# Patient Record
Sex: FEMALE | Race: WHITE | NOT HISPANIC OR LATINO | URBAN - METROPOLITAN AREA
[De-identification: names, ages, dates, MRNs, and addresses within clinical notes are randomized per-mention and may not be internally consistent; named-entity substitution may affect disease eponyms.]

---

## 2021-11-24 LAB
EXTERNAL HIV CONFIRMATION: NORMAL
EXTERNAL HIV SCREEN: NORMAL
HCV AB SER-ACNC: NEGATIVE

## 2024-01-25 ENCOUNTER — OFFICE VISIT (OUTPATIENT)
Dept: URGENT CARE | Facility: CLINIC | Age: 54
End: 2024-01-25
Payer: COMMERCIAL

## 2024-01-25 VITALS
DIASTOLIC BLOOD PRESSURE: 84 MMHG | WEIGHT: 180 LBS | OXYGEN SATURATION: 96 % | BODY MASS INDEX: 35.34 KG/M2 | SYSTOLIC BLOOD PRESSURE: 114 MMHG | RESPIRATION RATE: 18 BRPM | HEIGHT: 60 IN | TEMPERATURE: 98.3 F | HEART RATE: 83 BPM

## 2024-01-25 DIAGNOSIS — J40 SINOBRONCHITIS: Primary | ICD-10-CM

## 2024-01-25 DIAGNOSIS — J32.9 SINOBRONCHITIS: Primary | ICD-10-CM

## 2024-01-25 PROCEDURE — G0382 LEV 3 HOSP TYPE B ED VISIT: HCPCS | Performed by: PHYSICIAN ASSISTANT

## 2024-01-25 RX ORDER — ESCITALOPRAM OXALATE 10 MG/1
10 TABLET ORAL DAILY
COMMUNITY

## 2024-01-25 RX ORDER — DOXYCYCLINE 100 MG/1
100 TABLET ORAL 2 TIMES DAILY
Qty: 14 TABLET | Refills: 0 | Status: SHIPPED | OUTPATIENT
Start: 2024-01-25 | End: 2024-02-01

## 2024-01-25 RX ORDER — PREDNISONE 10 MG/1
10 TABLET ORAL DAILY
Qty: 21 TABLET | Refills: 0 | Status: SHIPPED | OUTPATIENT
Start: 2024-01-25

## 2024-01-25 RX ORDER — ALBUTEROL SULFATE 90 UG/1
AEROSOL, METERED RESPIRATORY (INHALATION)
COMMUNITY
Start: 2024-01-19

## 2024-01-25 RX ORDER — BENZONATATE 200 MG/1
200 CAPSULE ORAL 3 TIMES DAILY PRN
Qty: 20 CAPSULE | Refills: 0 | Status: SHIPPED | OUTPATIENT
Start: 2024-01-25 | End: 2024-02-08

## 2024-01-25 RX ORDER — AMOXICILLIN AND CLAVULANATE POTASSIUM 875; 125 MG/1; MG/1
TABLET, FILM COATED ORAL
COMMUNITY
Start: 2024-01-19

## 2024-01-25 RX ORDER — PREDNISONE 20 MG/1
TABLET ORAL
COMMUNITY
Start: 2024-01-19 | End: 2024-01-25

## 2024-01-25 NOTE — LETTER
January 25, 2024     Patient: Sho Harrison   YOB: 1970   Date of Visit: 1/25/2024       To Whom It May Concern:    It is my medical opinion that Sho Harrison may return to work on 1/29/2024 .           Sincerely,        Lorri Galindo PA-C

## 2024-01-26 NOTE — PROGRESS NOTES
[facility]        NAME: Sho Harrison is a 53 y.o. female  : 1970    MRN: [id number]  DATE: 2024  TIME: 7:49 PM    Assessment and Plan   Sinobronchitis [J32.9, J40]  1. Sinobronchitis  doxycycline (ADOXA) 100 MG tablet    benzonatate (TESSALON) 200 MG capsule    predniSONE 10 mg tablet    Ambulatory Referral to Family Practice            Patient Instructions   Medication as prescribed.  Reestablish care with family doctor.    Take doxycycline with food.  Do not consume dairy or reflux medications 2 hours before to hours after as this inhibit the absorption of the antibiotic.  Common side effects include nausea, vomiting, diarrhea, upset stomach.  Take a probiotic to avoid this.  Avoid sun exposure due to photophobia.      Follow up with PCP in 3-5 days.  Proceed to  ER if symptoms worsen.    Chief Complaint     Chief Complaint   Patient presents with    Cold Like Symptoms     Head congestion, cough, chest tightness x 3 weeks   Has 2 more doses of abx  Steroid finished yesterday   Also prescribed inhaler          History of Present Illness       Patient is a 53-year-old female with no significant past medical history presents the office complaining of head fullness, congestion, cough, and chest tightness for 3 weeks.  Denies fevers or chills.  Reports doing a telemedicine visit was diagnosed with sinobronchitis.  She was given an inhaler, prednisone 20 mg twice a day, and Augmentin.  States has been taking this for 1 week with little to no improvement in symptoms.        Review of Systems   Review of Systems   Constitutional:  Negative for chills and fever.   HENT:  Positive for congestion, postnasal drip and rhinorrhea. Negative for sore throat.    Respiratory:  Positive for cough and chest tightness. Negative for shortness of breath and wheezing.    Cardiovascular:  Negative for chest pain and palpitations.   Gastrointestinal:  Negative for abdominal pain, diarrhea, nausea and  vomiting.   Skin:  Negative for rash.   Neurological:  Positive for headaches. Negative for dizziness and light-headedness.         Current Medications       Current Outpatient Medications:     albuterol (PROVENTIL HFA,VENTOLIN HFA) 90 mcg/act inhaler, , Disp: , Rfl:     amoxicillin-clavulanate (AUGMENTIN) 875-125 mg per tablet, , Disp: , Rfl:     benzonatate (TESSALON) 200 MG capsule, Take 1 capsule (200 mg total) by mouth 3 (three) times a day as needed for cough for up to 14 days, Disp: 20 capsule, Rfl: 0    doxycycline (ADOXA) 100 MG tablet, Take 1 tablet (100 mg total) by mouth 2 (two) times a day for 7 days, Disp: 14 tablet, Rfl: 0    escitalopram (LEXAPRO) 10 mg tablet, Take 10 mg by mouth daily, Disp: , Rfl:     predniSONE 10 mg tablet, Take 1 tablet (10 mg total) by mouth daily Take 6 on day 1, take 5 on day 2, take 4 on day 3, take 3 on day 4, take 2 on day 5, take 1 on day 6., Disp: 21 tablet, Rfl: 0    Current Allergies     Allergies as of 2024    (No Known Allergies)            The following portions of the patient's history were reviewed and updated as appropriate: allergies, current medications, past family history, past medical history, past social history, past surgical history and problem list.     History reviewed. No pertinent past medical history.    Past Surgical History:   Procedure Laterality Date     SECTION         History reviewed. No pertinent family history.      Medications have been verified.        Objective   /84   Pulse 83   Temp 98.3 °F (36.8 °C) (Tympanic)   Resp 18   Ht 5' (1.524 m)   Wt 81.6 kg (180 lb)   SpO2 96%   BMI 35.15 kg/m²   No LMP recorded.       Physical Exam     Physical Exam  Vitals and nursing note reviewed.   Constitutional:       Appearance: Normal appearance. She is well-developed.   HENT:      Head: Normocephalic and atraumatic.      Right Ear: Tympanic membrane, ear canal and external ear normal.      Left Ear: Tympanic membrane, ear  canal and external ear normal.      Nose: Congestion and rhinorrhea present.      Mouth/Throat:      Pharynx: Uvula midline.   Eyes:      General: Lids are normal.      Conjunctiva/sclera: Conjunctivae normal.      Pupils: Pupils are equal, round, and reactive to light.   Cardiovascular:      Rate and Rhythm: Normal rate and regular rhythm.      Pulses: Normal pulses.      Heart sounds: Normal heart sounds. No murmur heard.     No friction rub. No gallop.   Pulmonary:      Effort: Pulmonary effort is normal.      Breath sounds: Normal breath sounds. No wheezing, rhonchi or rales.   Musculoskeletal:         General: Normal range of motion.      Cervical back: Neck supple.   Lymphadenopathy:      Cervical: No cervical adenopathy.   Skin:     General: Skin is warm and dry.      Capillary Refill: Capillary refill takes less than 2 seconds.   Neurological:      Mental Status: She is alert.

## 2024-04-01 PROBLEM — R25.2 LEG CRAMPING: Status: ACTIVE | Noted: 2022-12-08

## 2024-04-01 PROBLEM — F41.9 ANXIETY: Status: ACTIVE | Noted: 2023-07-20

## 2024-04-01 PROBLEM — D50.9 IRON DEFICIENCY ANEMIA: Status: ACTIVE | Noted: 2022-07-11

## 2024-04-01 PROBLEM — R76.8 ELEVATED ANTINUCLEAR ANTIBODY (ANA) LEVEL: Status: ACTIVE | Noted: 2021-10-05

## 2024-04-01 PROBLEM — D18.03 HEMANGIOMA OF LIVER: Status: ACTIVE | Noted: 2021-03-05

## 2024-04-01 PROBLEM — K57.92 DIVERTICULITIS: Status: ACTIVE | Noted: 2022-07-07

## 2024-04-01 RX ORDER — VIT C/HESPERIDIN/BIOFLAVONOIDS 500-100 MG
30 TABLET ORAL
COMMUNITY
End: 2024-04-02

## 2024-04-02 ENCOUNTER — OFFICE VISIT (OUTPATIENT)
Dept: FAMILY MEDICINE CLINIC | Facility: CLINIC | Age: 54
End: 2024-04-02
Payer: COMMERCIAL

## 2024-04-02 ENCOUNTER — TELEPHONE (OUTPATIENT)
Age: 54
End: 2024-04-02

## 2024-04-02 ENCOUNTER — TELEPHONE (OUTPATIENT)
Dept: ADMINISTRATIVE | Facility: OTHER | Age: 54
End: 2024-04-02

## 2024-04-02 VITALS
HEART RATE: 84 BPM | SYSTOLIC BLOOD PRESSURE: 130 MMHG | DIASTOLIC BLOOD PRESSURE: 90 MMHG | WEIGHT: 195 LBS | TEMPERATURE: 97.3 F | HEIGHT: 60 IN | OXYGEN SATURATION: 99 % | BODY MASS INDEX: 38.28 KG/M2

## 2024-04-02 DIAGNOSIS — Z12.31 ENCOUNTER FOR SCREENING MAMMOGRAM FOR BREAST CANCER: ICD-10-CM

## 2024-04-02 DIAGNOSIS — J40 SINOBRONCHITIS: ICD-10-CM

## 2024-04-02 DIAGNOSIS — Z00.00 ANNUAL PHYSICAL EXAM: Primary | ICD-10-CM

## 2024-04-02 DIAGNOSIS — R79.89 LOW VITAMIN D LEVEL: ICD-10-CM

## 2024-04-02 DIAGNOSIS — R73.9 HYPERGLYCEMIA: ICD-10-CM

## 2024-04-02 DIAGNOSIS — F41.9 ANXIETY: ICD-10-CM

## 2024-04-02 DIAGNOSIS — N92.1 MENORRHAGIA WITH IRREGULAR CYCLE: ICD-10-CM

## 2024-04-02 DIAGNOSIS — R76.8 ELEVATED ANTINUCLEAR ANTIBODY (ANA) LEVEL: ICD-10-CM

## 2024-04-02 DIAGNOSIS — D50.0 IRON DEFICIENCY ANEMIA DUE TO CHRONIC BLOOD LOSS: ICD-10-CM

## 2024-04-02 DIAGNOSIS — E78.2 MIXED HYPERLIPIDEMIA: ICD-10-CM

## 2024-04-02 DIAGNOSIS — J32.9 SINOBRONCHITIS: ICD-10-CM

## 2024-04-02 DIAGNOSIS — K57.92 DIVERTICULITIS: ICD-10-CM

## 2024-04-02 DIAGNOSIS — N95.1 PERIMENOPAUSAL: ICD-10-CM

## 2024-04-02 PROCEDURE — 99386 PREV VISIT NEW AGE 40-64: CPT | Performed by: NURSE PRACTITIONER

## 2024-04-02 PROCEDURE — 99214 OFFICE O/P EST MOD 30 MIN: CPT | Performed by: NURSE PRACTITIONER

## 2024-04-02 RX ORDER — TRANEXAMIC ACID 650 MG/1
TABLET ORAL
Qty: 20 TABLET | Refills: 1 | Status: SHIPPED | OUTPATIENT
Start: 2024-04-02 | End: 2024-04-02 | Stop reason: SDUPTHER

## 2024-04-02 RX ORDER — TRANEXAMIC ACID 650 MG/1
650 TABLET ORAL 2 TIMES DAILY PRN
Qty: 20 TABLET | Refills: 1 | Status: SHIPPED | OUTPATIENT
Start: 2024-04-02 | End: 2024-04-09

## 2024-04-02 RX ORDER — DIPHENOXYLATE HYDROCHLORIDE AND ATROPINE SULFATE 2.5; .025 MG/1; MG/1
1 TABLET ORAL DAILY
COMMUNITY

## 2024-04-02 NOTE — TELEPHONE ENCOUNTER
[facility] pharmacist requesting clarification on Rx: Tranexamic daily instructions.      Please review and advice  Thank you

## 2024-04-02 NOTE — LETTER
Procedure Request Form: Colonoscopy      Date Requested: 24  Patient: Sho Harrison  Patient : 1970   Referring Provider: PETEY Heck        Date of Procedure ______________________________       The above patient has informed us that they have completed their   most recent Colonoscopy at your facility. Please complete   this form and attach all corresponding procedure reports/results.    Comments __________________________________________________________  ____________________________________________________________________  ____________________________________________________________________  ____________________________________________________________________    Facility Completing Procedure _________________________________________    Form Completed By (print name) _______________________________________      Signature __________________________________________________________      These reports are needed for  compliance.    Please fax this completed form and a copy of the procedure report to our office located at [address] [place] [address] as soon as possible to Fax [phone] [place]: Phone [phone]    We thank you for your assistance in treating our mutual patient.

## 2024-04-02 NOTE — TELEPHONE ENCOUNTER
----- Message from PETEY Heck sent at 4/2/2024  8:45 AM EDT -----  Regarding: HIV,  HEP C, PAP, colonoscopy, mammo  Hello, our patient attached above has had       HIV          completed/performed. Please assist in updating the patient chart by pulling the Care Everywhere (CE) document. The date of service is 11/24/2021.     Hello, our patient attached above has had      Hep C           completed/performed. Please assist in updating the patient chart by pulling the Care Everywhere (CE) document. The date of service is 11/24/2021.    Hello, our patient attached above has had     pap            completed/performed. Please assist in updating the patient chart by pulling the Care Everywhere (CE) document. The date of service is 11/24/2021.      Hello, our patient attached above has had   a colonoscopy              completed/performed. Please assist in updating the patient chart by pulling the Care Everywhere (CE) document. The date of service is 11/219/2022 - it is documented with [facility] as a scanned documented but we are unable to retrieve it - She is being seen this morning so I will find out the location.        Hello, our patient attached above has had   mammogram              completed/performed. Please assist in updating the patient chart by pulling the Care Everywhere (CE) document. The date of service is 02/12/2021.  Is overdue but can you please link this into her health maintenance anyway?  Thanks.        [facility]

## 2024-04-02 NOTE — ASSESSMENT & PLAN NOTE
She has been on Lexapro 10 mg since 06/2023. Reports improvement with the medication - feels much more in control of her anxiety.

## 2024-04-02 NOTE — PROGRESS NOTES
ADULT ANNUAL PHYSICAL  [facility] PRIMARY CARE    NAME: Sho Harrison  AGE: 54 y.o. SEX: female  : 1970     DATE: 2024     Assessment and Plan:     Problem List Items Addressed This Visit          Behavioral Health    Anxiety     She has been on Lexapro 10 mg since 2023. Reports improvement with the medication - feels much more in control of her anxiety.            Blood    Iron deficiency anemia     Will recheck iron panel and CBC as she has been with menorrhagia for the past two months.           Relevant Orders    Iron Panel (Includes Ferritin, Iron Sat%, Iron, and TIBC)       Other    Diverticulitis     No recurrence at this time - has tried to eat better to avoid another flare up.           Elevated antinuclear antibody (PEACE) level     Notes she saw rheumatology in the past, did not return to them for a follow-up.    Denies any acute pain in her joints at this time.           Hyperlipidemia    Relevant Orders    Lipid panel    Comprehensive metabolic panel    Ambulatory Referral to Nutrition Services     Other Visit Diagnoses       Annual physical exam    -  Primary    Sinobronchitis        Menorrhagia with irregular cycle        Relevant Medications    Tranexamic Acid 650 MG TABS    Other Relevant Orders    Ambulatory Referral to Obstetrics / Gynecology    Perimenopausal        Relevant Orders    Ambulatory Referral to Obstetrics / Gynecology    Ambulatory Referral to Nutrition Services    Hyperglycemia        Relevant Orders    Hemoglobin A1C    Ambulatory Referral to Nutrition Services    Low vitamin D level        Relevant Orders    Vitamin D 25 hydroxy    BMI 38.0-38.9,adult        Relevant Orders    Ambulatory Referral to Nutrition Services    Encounter for screening mammogram for breast cancer        Relevant Orders    Mammo screening bilateral w 3d & cad            Immunizations and preventive care screenings were discussed with patient  today. Appropriate education was printed on patient's after visit summary.    Counseling:  menopause      Depression Screening and Follow-up Plan: Patient was screened for depression during today's encounter. They screened negative with a PHQ-2 score of 0.    Will have her see Gyn for menorrhagia sx.  Tranexamic acid ordered to use as needed with menses.      Screening labs ordered.     Mammogram ordered.     Colonoscopy done in  - will send for report from [facility]      BMI Counseling: Body mass index is 38.08 kg/m². The BMI is above normal. Nutrition recommendations include reducing portion sizes, decreasing overall calorie intake, and 3-5 servings of fruits/vegetables daily. Exercise recommendations include moderate aerobic physical activity for 150 minutes/week. Patient referred to nutritionist due to patient being overweight.      Return in 3 months (on 2024) for Recheck.     Chief Complaint:     Chief Complaint   Patient presents with    Physical Exam      History of Present Illness:     Adult Annual Physical   Patient here for a comprehensive physical exam. The patient reports recent weight gain over the past 9 months. Notes about 30 pound increase over time.  States of disordered bleeding - has had 4 menses in about 2 months.  Notes her menses has always been heavy but more so now.  Has taken medication in the past to help with the bleeding.      Diet and Physical Activity  Diet/Nutrition: trying to eat more healthy  Exercise: no formal exercise.      Depression Screening  PHQ-2/9 Depression Screening    Little interest or pleasure in doing things: 0 - not at all  Feeling down, depressed, or hopeless: 0 - not at all  Trouble falling or staying asleep, or sleeping too much: 1 - several days  Feeling tired or having little energy: 1 - several days  Poor appetite or overeatin - several days  Feeling bad about yourself - or that you are a failure or have let yourself or your family down: 1 -  several days  Trouble concentrating on things, such as reading the newspaper or watching television: 0 - not at all  Moving or speaking so slowly that other people could have noticed. Or the opposite - being so fidgety or restless that you have been moving around a lot more than usual: 0 - not at all  Thoughts that you would be better off dead, or of hurting yourself in some way: 0 - not at all  PHQ-2 Score: 0  PHQ-2 Interpretation: Negative depression screen  PHQ-9 Score: 4  PHQ-9 Interpretation: No or Minimal depression       General Health  Sleep: sleeps well.   Hearing: normal - bilateral.  Vision: no vision problems and wears glasses.   Dental: brushes teeth twice daily.       /GYN Health  Follows with gynecology? yes   Patient is: perimenopausal  Last menstrual period: irregular see above  Contraceptive method: menopause.    Advanced Care Planning  Do you have an advanced directive? no  Do you have a durable medical power of ? no  ACP document given to the patient? no     Review of Systems:     Review of Systems   Constitutional: Negative.    HENT: Negative.     Respiratory: Negative.     Cardiovascular: Negative.    Gastrointestinal: Negative.    Genitourinary:  Positive for menstrual problem.   Neurological: Negative.    All other systems reviewed and are negative.     Past Medical History:     History reviewed. No pertinent past medical history.   Past Surgical History:     Past Surgical History:   Procedure Laterality Date     SECTION        Social History:     Social History     Socioeconomic History    Marital status:      Spouse name: None    Number of children: None    Years of education: None    Highest education level: None   Occupational History    None   Tobacco Use    Smoking status: Never     Passive exposure: Never    Smokeless tobacco: Never   Vaping Use    Vaping status: Never Used   Substance and Sexual Activity    Alcohol use: Not Currently    Drug use: Never     Sexual activity: Not Currently   Other Topics Concern    None   Social History Narrative    None     Social Determinants of Health     Financial Resource Strain: Not on file   Food Insecurity: Not on file   Transportation Needs: Not on file   Physical Activity: Not on file   Stress: Not on file   Social Connections: Not on file   Intimate Partner Violence: Not on file   Housing Stability: Not on file      Family History:     History reviewed. No pertinent family history.   Current Medications:     Current Outpatient Medications   Medication Sig Dispense Refill    escitalopram (LEXAPRO) 10 mg tablet Take 10 mg by mouth daily      multivitamin (THERAGRAN) TABS Take 1 tablet by mouth daily      Tranexamic Acid 650 MG TABS Take 1 to 2 times a day on days 2 and 3 of menses as needed for heavy bleeding. 20 tablet 1     No current facility-administered medications for this visit.      Allergies:     No Known Allergies   Physical Exam:     /90 (BP Location: Left arm, Patient Position: Sitting, Cuff Size: Extra-Large)   Pulse 84   Temp (!) 97.3 °F (36.3 °C)   Ht 5' (1.524 m)   Wt 88.5 kg (195 lb)   SpO2 99%   BMI 38.08 kg/m²     Physical Exam  Constitutional:       Appearance: Normal appearance.   Cardiovascular:      Rate and Rhythm: Normal rate and regular rhythm.      Heart sounds: Normal heart sounds.   Pulmonary:      Effort: Pulmonary effort is normal.      Breath sounds: Normal breath sounds.   Abdominal:      General: Abdomen is flat. Bowel sounds are normal. There is no distension.      Palpations: Abdomen is soft.      Tenderness: There is no abdominal tenderness.   Neurological:      General: No focal deficit present.      Mental Status: She is alert and oriented to person, place, and time.   Psychiatric:         Mood and Affect: Mood normal.         Behavior: Behavior normal.         Thought Content: Thought content normal.         Judgment: Judgment normal.          PETEY Heck  [facility]

## 2024-04-02 NOTE — TELEPHONE ENCOUNTER
Colonoscopy:    Upon review of the In Basket request and the patient's chart, initial outreach has been made via fax to facility. Please see Contacts section for details.     Thank you  Yvrose Smith

## 2024-04-02 NOTE — ASSESSMENT & PLAN NOTE
Notes she saw rheumatology in the past, did not return to them for a follow-up.    Denies any acute pain in her joints at this time.

## 2024-04-02 NOTE — TELEPHONE ENCOUNTER
Upon review of the In Basket request we were able to locate, review, and update the patient chart as requested for Hepatitis C , HIV, Mammogram, and Pap Smear (HPV) aka Cervical Cancer Screening.    Any additional questions or concerns should be emailed to the Practice Liaisons via the appropriate education email address, please do not reply via In Basket.    Thank you  Yvrose Smith

## 2024-04-05 ENCOUNTER — TELEPHONE (OUTPATIENT)
Dept: ADMINISTRATIVE | Facility: OTHER | Age: 54
End: 2024-04-05

## 2024-04-05 NOTE — TELEPHONE ENCOUNTER
----- Message from Puja Ren MA sent at 4/5/2024  9:43 AM EDT -----  Regarding: Care Gap request  04/05/24 9:43 AM    Hello, our patient attached above has had CRC: Colonoscopy completed/performed. Please assist in updating the patient chart by pulling the document from the Media Tab. The date of service is 2022.     Thank you,  Puja Ren  [facility] PRIMARY [facility]

## 2024-04-08 ENCOUNTER — TELEPHONE (OUTPATIENT)
Dept: FAMILY MEDICINE CLINIC | Facility: CLINIC | Age: 54
End: 2024-04-08

## 2024-04-08 NOTE — TELEPHONE ENCOUNTER
Upon review of the In Basket request we were able to locate, review, and update the patient chart as requested for CRC: Colonoscopy.    Any additional questions or concerns should be emailed to the Practice Liaisons via the appropriate education email address, please do not reply via In Basket.    Thank you  Yvrose Smith

## 2024-04-08 NOTE — TELEPHONE ENCOUNTER
Upon review of the In Basket request we have found this is a duplicate request and no further action is needed. This request was completed upon initial request, the patient chart is up to date, and this message will now be closed.    Any additional questions or concerns should be emailed to the Practice Liaisons via the appropriate education email address, please do not reply via In Basket.    Thank you  Ale Tai

## 2024-04-08 NOTE — TELEPHONE ENCOUNTER
As a follow-up, a second attempt has been made for outreach via fax to facility. Please see Contacts section for details.    Thank you  Yvrose Smith

## 2024-05-18 ENCOUNTER — APPOINTMENT (OUTPATIENT)
Dept: LAB | Facility: MEDICAL CENTER | Age: 54
End: 2024-05-18
Payer: COMMERCIAL

## 2024-05-18 DIAGNOSIS — E78.2 MIXED HYPERLIPIDEMIA: ICD-10-CM

## 2024-05-18 DIAGNOSIS — R79.89 LOW VITAMIN D LEVEL: ICD-10-CM

## 2024-05-18 DIAGNOSIS — R73.9 HYPERGLYCEMIA: ICD-10-CM

## 2024-05-18 DIAGNOSIS — D50.0 IRON DEFICIENCY ANEMIA DUE TO CHRONIC BLOOD LOSS: ICD-10-CM

## 2024-05-18 LAB
25(OH)D3 SERPL-MCNC: 21.4 NG/ML (ref 30–100)
ALBUMIN SERPL BCP-MCNC: 4.2 G/DL (ref 3.5–5)
ALP SERPL-CCNC: 71 U/L (ref 34–104)
ALT SERPL W P-5'-P-CCNC: 21 U/L (ref 7–52)
ANION GAP SERPL CALCULATED.3IONS-SCNC: 7 MMOL/L (ref 4–13)
AST SERPL W P-5'-P-CCNC: 19 U/L (ref 13–39)
BILIRUB SERPL-MCNC: 0.42 MG/DL (ref 0.2–1)
BUN SERPL-MCNC: 13 MG/DL (ref 5–25)
CALCIUM SERPL-MCNC: 9.4 MG/DL (ref 8.4–10.2)
CHLORIDE SERPL-SCNC: 104 MMOL/L (ref 96–108)
CHOLEST SERPL-MCNC: 236 MG/DL
CO2 SERPL-SCNC: 28 MMOL/L (ref 21–32)
CREAT SERPL-MCNC: 0.59 MG/DL (ref 0.6–1.3)
EST. AVERAGE GLUCOSE BLD GHB EST-MCNC: 131 MG/DL
FERRITIN SERPL-MCNC: 4 NG/ML (ref 11–307)
GFR SERPL CREATININE-BSD FRML MDRD: 104 ML/MIN/1.73SQ M
GLUCOSE P FAST SERPL-MCNC: 98 MG/DL (ref 65–99)
HBA1C MFR BLD: 6.2 %
HDLC SERPL-MCNC: 44 MG/DL
IRON SATN MFR SERPL: 8 % (ref 15–50)
IRON SERPL-MCNC: 35 UG/DL (ref 50–212)
LDLC SERPL CALC-MCNC: 155 MG/DL (ref 0–100)
NONHDLC SERPL-MCNC: 192 MG/DL
POTASSIUM SERPL-SCNC: 4.6 MMOL/L (ref 3.5–5.3)
PROT SERPL-MCNC: 7.5 G/DL (ref 6.4–8.4)
SODIUM SERPL-SCNC: 139 MMOL/L (ref 135–147)
TIBC SERPL-MCNC: 458 UG/DL (ref 250–450)
TRIGL SERPL-MCNC: 185 MG/DL
UIBC SERPL-MCNC: 423 UG/DL (ref 155–355)

## 2024-05-18 PROCEDURE — 80061 LIPID PANEL: CPT

## 2024-05-18 PROCEDURE — 83036 HEMOGLOBIN GLYCOSYLATED A1C: CPT

## 2024-05-18 PROCEDURE — 82728 ASSAY OF FERRITIN: CPT

## 2024-05-18 PROCEDURE — 80053 COMPREHEN METABOLIC PANEL: CPT

## 2024-05-18 PROCEDURE — 36415 COLL VENOUS BLD VENIPUNCTURE: CPT

## 2024-05-18 PROCEDURE — 82306 VITAMIN D 25 HYDROXY: CPT

## 2024-05-18 PROCEDURE — 83550 IRON BINDING TEST: CPT

## 2024-05-18 PROCEDURE — 83540 ASSAY OF IRON: CPT

## 2024-05-20 ENCOUNTER — TELEPHONE (OUTPATIENT)
Dept: FAMILY MEDICINE CLINIC | Facility: CLINIC | Age: 54
End: 2024-05-20

## 2024-05-20 DIAGNOSIS — E61.1 IRON DEFICIENCY: Primary | ICD-10-CM

## 2024-05-20 NOTE — TELEPHONE ENCOUNTER
----- Message from Robert Budinetz, MD sent at 5/20/2024  9:50 AM EDT -----  Borderline diabetes-- diet/exercise/weight loss  Vit d def-- start OTC d3 2000iu daily  She has iron deficiency anemia, is this known?  Recommend she be UTD on colonoscopy

## 2024-05-22 ENCOUNTER — APPOINTMENT (OUTPATIENT)
Age: 54
End: 2024-05-22
Payer: COMMERCIAL

## 2024-05-22 DIAGNOSIS — E61.1 IRON DEFICIENCY: ICD-10-CM

## 2024-05-22 LAB
ERYTHROCYTE [DISTWIDTH] IN BLOOD BY AUTOMATED COUNT: 15 % (ref 11.6–15.1)
HCT VFR BLD AUTO: 36.9 % (ref 34.8–46.1)
HGB BLD-MCNC: 10.6 G/DL (ref 11.5–15.4)
MCH RBC QN AUTO: 22.9 PG (ref 26.8–34.3)
MCHC RBC AUTO-ENTMCNC: 28.7 G/DL (ref 31.4–37.4)
MCV RBC AUTO: 80 FL (ref 82–98)
PLATELET # BLD AUTO: 326 THOUSANDS/UL (ref 149–390)
PMV BLD AUTO: 10 FL (ref 8.9–12.7)
RBC # BLD AUTO: 4.63 MILLION/UL (ref 3.81–5.12)
WBC # BLD AUTO: 7.26 THOUSAND/UL (ref 4.31–10.16)

## 2024-05-22 PROCEDURE — 36415 COLL VENOUS BLD VENIPUNCTURE: CPT

## 2024-05-22 PROCEDURE — 85027 COMPLETE CBC AUTOMATED: CPT

## 2024-05-27 ENCOUNTER — TELEPHONE (OUTPATIENT)
Dept: FAMILY MEDICINE CLINIC | Facility: CLINIC | Age: 54
End: 2024-05-27

## 2024-05-27 NOTE — TELEPHONE ENCOUNTER
----- Message from PETEY Heck sent at 5/23/2024 10:12 AM EDT -----  Her colonoscopy was fine.  I saw her as a new patient on 04/02/2024.  I had noted that she has had heavy menstrual bleeding for about 2 months and wanted the iron levels rechecked - that was the reason why.  Her iron levels are low but I suspect it i  s GYN related.   Can we pass this on to her again - she does not have MyChart set up.  Please apologize for the confusion with her results - I was out of the office earlier this week.    If she has a GYN she should make an appointment with them, if she does not, then we can refer her to [facility].  If her bleeding is still heavy, please let me know.  Other options are that I can refer her to GYN and also order a pelvic US to begin furth  er evaluation for her.  Sometimes fibroids or polyps cause increased bleeding.      Thanks.

## 2024-05-28 ENCOUNTER — HOSPITAL ENCOUNTER (OUTPATIENT)
Dept: RADIOLOGY | Facility: CLINIC | Age: 54
Discharge: HOME | End: 2024-05-28
Payer: COMMERCIAL

## 2024-05-28 VITALS — WEIGHT: 190 LBS | HEIGHT: 61 IN | BODY MASS INDEX: 35.87 KG/M2

## 2024-05-28 DIAGNOSIS — Z12.31 ENCOUNTER FOR SCREENING MAMMOGRAM FOR BREAST CANCER: ICD-10-CM

## 2024-05-28 PROCEDURE — 77063 BREAST TOMOSYNTHESIS BI: CPT

## 2024-05-28 PROCEDURE — 77067 SCR MAMMO BI INCL CAD: CPT

## 2024-05-29 ENCOUNTER — TELEPHONE (OUTPATIENT)
Dept: FAMILY MEDICINE CLINIC | Facility: CLINIC | Age: 54
End: 2024-05-29

## 2024-05-29 DIAGNOSIS — N92.1 MENORRHAGIA WITH IRREGULAR CYCLE: Primary | ICD-10-CM

## 2024-05-29 NOTE — TELEPHONE ENCOUNTER
----- Message from PETEY Heck sent at 5/29/2024  4:11 PM EDT -----  Can we make Cathleen aware that her Mammogram was negative - she is due in one year for her next screening.

## 2024-06-12 NOTE — PROGRESS NOTES
Assessment        Diagnoses and all orders for this visit:    Encntr for gyn exam (general) (routine) w/o abn findings    Menorrhagia with irregular cycle  -     Ambulatory Referral to Obstetrics / Gynecology  -     CBC; Future  -     hCG, quantitative; Future  -     TSH, 3rd generation with Free T4 reflex; Future  -     Follicle stimulating hormone; Future  -     US pelvis complete w transvaginal; Future    Perimenopausal  -     Ambulatory Referral to Obstetrics / Gynecology  -     CBC; Future  -     hCG, quantitative; Future  -     TSH, 3rd generation with Free T4 reflex; Future  -     Follicle stimulating hormone; Future  -     US pelvis complete w transvaginal; Future    Cervical cancer screening  -     Liquid-based pap, screening    Other orders  -     cholecalciferol (VITAMIN D3) 1,000 units tablet; Take 1,000 Units by mouth daily             Plan      All questions answered.  Await pap smear results.  Blood tests: CBC with diff, FSH, Quantitative hCG, and TSH.  Contraception: none.  Diagnosis explained in detail, including differential.  Discussed healthy lifestyle modifications.  Educational material distributed.  Follow up in 3 weeks.  Follow up as needed.  Pap smear.  Pelvic ultrasound.       Patient with abnormal uterine bleeding.  She did have a history of cervical polyp and endometrial polyp.  Pelvic ultrasound was ordered to further evaluate abnormal uterine bleeding and asked patient to return for endometrial biopsy.  Cytotec was prescribed preprocedure due to history of 2 prior C-sections and possible cervical stenosis.    Patient advised to have blood work including FSH to check menopausal status.    Subjective      Sho Harrison is a 54 y.o. female who presents for annual exam.      Chief Complaint   Patient presents with    New Patient Visit       She is reporting being in perimenopause. She would have no periods for 3-4 months then would bleed heavily, bleeding through a pad and  underwear.  She is on Tranexamic acid  She is not sexually active.    Cervical and uterine polypectomy 22  Pathology shows benign polyp    She does not recall if she had improvement in her symptoms    CBC 10.6      Last Pap: 21  Last mammogram: 24  Colorectal cancer screenin2022, due in 10 years [facility]    Current contraception: none  History of abnormal Pap smear: yes  History of abnormal mammogram: no  Family history of uterine or ovarian cancer: no  Family history of breast cancer: MGM 60s  Family history of colon cancer: no    OB History    Para Term  AB Living   4 0 0 0 1 3   SAB IAB Ectopic Multiple Live Births   1 0 0 0 3      # Outcome Date GA Lbr Edmar/2nd Weight Sex Type Anes PTL Lv   4       CS-Unspec  Y JENNY   3      M    JENNY   2      M CS-Unspec   JENNY   1 SAB               Obstetric Comments   Menarche: 13       Menstrual History:  OB History          4    Para   0    Term   0       0    AB   1    Living   3         SAB   1    IAB   0    Ectopic   0    Multiple   0    Live Births   3           Obstetric Comments   Menarche: 13              Menarche age: 13  No LMP recorded. Patient is premenopausal.     She states she has not skipped a period over a year.  She states her periods are erratic, lasting 4-5 days, occurring every 3-4 months, sometimes twice in a month.  She states periods are heavy for 2 days          No past medical history on file.  Past Surgical History:   Procedure Laterality Date     SECTION       Family History   Problem Relation Age of Onset    No Known Problems Mother     No Known Problems Father     No Known Problems Sister     No Known Problems Sister     No Known Problems Daughter     Breast cancer Maternal Grandmother         late 60's    No Known Problems Maternal Grandfather     No Known Problems Paternal Grandmother     No Known Problems Paternal Grandfather     No Known Problems  Maternal Aunt     No Known Problems Maternal Aunt     No Known Problems Maternal Aunt     Colon cancer Paternal Uncle        Social History     Tobacco Use    Smoking status: Never     Passive exposure: Never    Smokeless tobacco: Never   Vaping Use    Vaping status: Never Used   Substance Use Topics    Alcohol use: Not Currently    Drug use: Never          Current Outpatient Medications:     cholecalciferol (VITAMIN D3) 1,000 units tablet, Take 1,000 Units by mouth daily, Disp: , Rfl:     escitalopram (LEXAPRO) 10 mg tablet, Take 10 mg by mouth daily, Disp: , Rfl:     multivitamin (THERAGRAN) TABS, Take 1 tablet by mouth daily, Disp: , Rfl:     No Known Allergies        Review of Systems   Constitutional: Negative.    HENT: Negative.     Eyes: Negative.    Respiratory: Negative.     Cardiovascular: Negative.    Gastrointestinal: Negative.    Endocrine: Negative.    Genitourinary:         As noted in HPI   Musculoskeletal: Negative.    Skin: Negative.    Allergic/Immunologic: Negative.    Neurological: Negative.    Hematological: Negative.    Psychiatric/Behavioral: Negative.         /80   Pulse 84   Ht 5' (1.524 m)   Wt 88.5 kg (195 lb)   SpO2 98%   BMI 38.08 kg/m²         Physical Exam  Constitutional:       Appearance: She is well-developed.   Genitourinary:      Vulva, bladder and rectum normal.      No lesions in the vagina.      Right Labia: No rash, tenderness, lesions, skin changes or Bartholin's cyst.     Left Labia: No tenderness, lesions, skin changes, Bartholin's cyst or rash.     No inguinal adenopathy present in the right or left side.     No vaginal discharge, tenderness or bleeding.      No vaginal prolapse present.     No vaginal atrophy present.       Right Adnexa: not tender, not full and no mass present.     Left Adnexa: not tender, not full and no mass present.     No cervical motion tenderness, friability, lesion or polyp.      Uterus is not enlarged or tender.      Pelvic exam was  performed with patient in the lithotomy position.   Rectum:      No external hemorrhoid.   Breasts:     Right: No mass, nipple discharge, skin change or tenderness.      Left: No mass, nipple discharge, skin change or tenderness.   HENT:      Head: Normocephalic.      Nose: Nose normal.   Eyes:      Conjunctiva/sclera: Conjunctivae normal.   Neck:      Thyroid: No thyromegaly.   Cardiovascular:      Rate and Rhythm: Normal rate.   Pulmonary:      Effort: Pulmonary effort is normal.   Abdominal:      General: There is no distension.      Palpations: Abdomen is soft. There is no mass.      Tenderness: There is no abdominal tenderness. There is no guarding or rebound.   Musculoskeletal:         General: No tenderness.      Cervical back: Neck supple. No muscular tenderness.   Lymphadenopathy:      Cervical: No cervical adenopathy.      Lower Body: No right inguinal adenopathy. No left inguinal adenopathy.   Neurological:      Mental Status: She is alert and oriented to person, place, and time.   Skin:     General: Skin is warm and dry.   Psychiatric:         Mood and Affect: Mood normal.         Behavior: Behavior normal.           Future Appointments   Date Time Provider Department [place]   7/3/2024  6:30 PM PETEY Heck [facility] [place]   6/5/2025  8:30 AM [facility] [place] [facility]

## 2024-06-13 ENCOUNTER — OFFICE VISIT (OUTPATIENT)
Dept: OBGYN CLINIC | Facility: CLINIC | Age: 54
End: 2024-06-13
Payer: COMMERCIAL

## 2024-06-13 ENCOUNTER — APPOINTMENT (OUTPATIENT)
Dept: LAB | Facility: CLINIC | Age: 54
End: 2024-06-13
Payer: COMMERCIAL

## 2024-06-13 VITALS
SYSTOLIC BLOOD PRESSURE: 136 MMHG | HEIGHT: 60 IN | HEART RATE: 84 BPM | WEIGHT: 195 LBS | BODY MASS INDEX: 38.28 KG/M2 | OXYGEN SATURATION: 98 % | DIASTOLIC BLOOD PRESSURE: 80 MMHG

## 2024-06-13 DIAGNOSIS — Z01.419 ENCNTR FOR GYN EXAM (GENERAL) (ROUTINE) W/O ABN FINDINGS: Primary | ICD-10-CM

## 2024-06-13 DIAGNOSIS — N88.2 CERVICAL STENOSIS (UTERINE CERVIX): ICD-10-CM

## 2024-06-13 DIAGNOSIS — Z12.4 CERVICAL CANCER SCREENING: ICD-10-CM

## 2024-06-13 DIAGNOSIS — N92.1 MENORRHAGIA WITH IRREGULAR CYCLE: ICD-10-CM

## 2024-06-13 DIAGNOSIS — N95.1 PERIMENOPAUSAL: ICD-10-CM

## 2024-06-13 LAB
B-HCG SERPL-ACNC: <0.6 MIU/ML (ref 0–5)
ERYTHROCYTE [DISTWIDTH] IN BLOOD BY AUTOMATED COUNT: 17.7 % (ref 11.6–15.1)
FSH SERPL-ACNC: 6.5 MIU/ML
HCT VFR BLD AUTO: 37.3 % (ref 34.8–46.1)
HGB BLD-MCNC: 10.9 G/DL (ref 11.5–15.4)
MCH RBC QN AUTO: 23.5 PG (ref 26.8–34.3)
MCHC RBC AUTO-ENTMCNC: 29.2 G/DL (ref 31.4–37.4)
MCV RBC AUTO: 81 FL (ref 82–98)
PLATELET # BLD AUTO: 330 THOUSANDS/UL (ref 149–390)
PMV BLD AUTO: 9.4 FL (ref 8.9–12.7)
RBC # BLD AUTO: 4.63 MILLION/UL (ref 3.81–5.12)
TSH SERPL DL<=0.05 MIU/L-ACNC: 2.09 UIU/ML (ref 0.45–4.5)
WBC # BLD AUTO: 6.7 THOUSAND/UL (ref 4.31–10.16)

## 2024-06-13 PROCEDURE — 83001 ASSAY OF GONADOTROPIN (FSH): CPT

## 2024-06-13 PROCEDURE — 84702 CHORIONIC GONADOTROPIN TEST: CPT

## 2024-06-13 PROCEDURE — 36415 COLL VENOUS BLD VENIPUNCTURE: CPT

## 2024-06-13 PROCEDURE — G0145 SCR C/V CYTO,THINLAYER,RESCR: HCPCS | Performed by: OBSTETRICS & GYNECOLOGY

## 2024-06-13 PROCEDURE — 85027 COMPLETE CBC AUTOMATED: CPT

## 2024-06-13 PROCEDURE — G0476 HPV COMBO ASSAY CA SCREEN: HCPCS | Performed by: OBSTETRICS & GYNECOLOGY

## 2024-06-13 PROCEDURE — 99386 PREV VISIT NEW AGE 40-64: CPT | Performed by: OBSTETRICS & GYNECOLOGY

## 2024-06-13 PROCEDURE — 84443 ASSAY THYROID STIM HORMONE: CPT

## 2024-06-13 RX ORDER — MISOPROSTOL 200 UG/1
TABLET ORAL
Qty: 2 TABLET | Refills: 0 | Status: SHIPPED | OUTPATIENT
Start: 2024-06-13

## 2024-06-13 RX ORDER — MELATONIN
1000 DAILY
COMMUNITY

## 2024-06-13 NOTE — PATIENT INSTRUCTIONS
Abnormal Uterine Bleeding    What is a normal menstrual cycle?  The normal length of the menstrual cycle is typically between 24 days and 38 days. A normal menstrual period generally lasts up to 8 days.   When is bleeding abnormal?  Bleeding in any of the following situations is considered abnormal uterine bleeding:  Bleeding or spotting between periods  Bleeding or spotting after sex  Heavy bleeding during your period  Menstrual cycles that are longer than 38 days or shorter than 24 days  “Irregular” periods in which cycle length varies by more than 7-9 days  Bleeding after menopause  At what ages is abnormal bleeding more common?  Abnormal bleeding can occur at any age. However, at certain times in a woman’s life it is common for periods to be somewhat irregular. Periods may not occur regularly when a girl first starts having them (around age 9-14 years). During perimenopause (beginning in the mid-40s), the number of days between periods may change. It also is normal to skip periods or for bleeding to get lighter or heavier during perimenopause.  What causes abnormal bleeding?  Some of the causes of abnormal bleeding include the following:  Problems with ovulation  Fibroids and polyps  A condition in which the endometrium grows into the wall of the uterus  Bleeding disorders  Problems linked to some birth control methods, such as an intrauterine device (IUD) or birth control pills  Miscarriage  Ectopic pregnancy  Certain types of cancer, such as cancer of the uterus  Your obstetrician-gynecologist (ob-gyn) or other health care professional may start by checking for problems most common in your age group. Some of them are not serious and are easy to treat. Others can be more serious. All should be checked.  How is abnormal bleeding diagnosed?  Your ob-gyn or other health care professional will ask about your health history and your menstrual cycle. It may be helpful to keep track of your menstrual cycle before your  visit. Note the dates, length, and type (light, medium, heavy, or spotting) of your bleeding on a calendar. You also can use a smartphone urban designed to track menstrual cycles.   You will have a physical exam. You also may have blood tests. These tests check your blood count and hormone levels and rule out some diseases of the blood. You also may have a pregnancy test and tests for sexually transmitted  infections (STIs).  What tests may be needed to diagnose abnormal bleeding?  Based on your symptoms and your age, other tests may be needed. Some of these tests can be done in your ob-gyn’s office. Others may be done at a hospital or surgical center:  Ultrasound exam--Sound waves are used to make a picture of the pelvic organs.  Hysteroscopy--A thin, lighted scope is inserted through the vagina and the opening of the cervix. It allows your ob-gyn or other health care professional to see the inside of the uterus.  Endometrial biopsy--A sample of the endometrium is removed and looked at under a microscope.  Sonohysterography--Fluid is placed in the uterus through a thin tube while ultrasound images are made of the inside of the uterus.  Magnetic resonance imaging (MRI)--An MRI exam uses a strong magnetic field and sound waves to create images of the internal organs.  Computed tomography (CT)--This X-ray procedure shows internal organs and structures in cross section.  What medications are used to help control abnormal bleeding?  Medications often are tried first to treat irregular or heavy menstrual bleeding. The medications that may be used include the following:  Hormonal birth control methods--Birth control pills, the skin patch, and the vaginal ring contain hormones. These hormones can lighten menstrual flow. They also help make periods more regular.  Gonadotropin-releasing hormone (GnRH) agonists--These drugs can stop the menstrual cycle and reduce the size of fibroids.  Tranexamic acid--This medication treats  heavy menstrual bleeding.  Nonsteroidal anti-inflammatory drugs--These drugs, which include ibuprofen, may help control heavy bleeding and relieve menstrual cramps.  Antibiotics--If you have an infection, you may be given an antibiotic.  Special medications--If you have a bleeding disorder, your treatment may include medication to help your blood clot.  What types of surgery are performed to treat abnormal bleeding?  If medication does not reduce your bleeding, a surgical procedure may be needed. There are different types of surgery depending on your condition, your age, and whether you want to have more children.  Endometrial ablation destroys the lining of the uterus. It stops or reduces the total amount of bleeding. Pregnancy is not likely after ablation, but it can happen. If it does, the risk of serious complications, including life-threatening bleeding, is greatly increased. If you have this procedure, you will need to use birth control until after menopause.   Uterine artery embolization is a procedure used to treat fibroids. This procedure blocks the blood vessels to the uterus, which in turn stops the blood flow that fibroids need to grow. Another treatment, myomectomy, removes the fibroids but not the uterus.   Hysterectomy, the surgical removal of the uterus, is used to treat some conditions or when other treatments have failed. Hysterectomy also is used to treat endometrial cancer. After the uterus is removed, a woman can no longer get pregnant and will no longer have periods.      Glossary  Abnormal Uterine Bleeding: Bleeding from the uterus that differs in frequency, regularity, duration, or amount from normal uterine bleeding in the absence of pregnancy.   Cervix: The opening of the uterus at the top of the vagina.   Ectopic Pregnancy: A pregnancy in which the fertilized egg begins to grow in a place other than inside the uterus, usually in the fallopian tubes.   Endometrium: The lining of the uterus.    Fibroids: Benign (noncancerous) growths that form on the inside of the uterus, on its outer surface, or within the uterine wall itself.   Gonadotropin-releasing Hormone (GnRH) Agonists: Medical therapy used to block the effects of certain hormones.   Intrauterine device (IUD): A small device that is inserted and left inside the uterus to prevent pregnancy.   Menopause: The process in a woman’s life when ovaries stop functioning and menstruation stops.  Menstrual Cycle: The monthly process of changes that occur to prepare a woman’s body for possible pregnancy. A menstrual cycle is defined as the first day of menstrual bleeding of one cycle to the first day of menstrual bleeding of the next cycle.   Miscarriage: The spontaneous loss of a pregnancy before the fetus can survive outside the uterus.   Obstetrician-Gynecologist (Ob-Gyn): A physician with special skills, training, and education in women’s health.   Ovulation: The release of an egg from one of the ovaries.   Perimenopause: The period before menopause that usually extends from age 45 years to 55 years.   Polyps: Growths that develop from membrane tissue, such as that lining the inside of the uterus.   Sexually Transmitted Infections (STIs): Infections that are spread by sexual contact, including chlamydia, gonorrhea, human papillomavirus (HPV), herpes, syphilis, and human immunodeficiency virus (HIV, the cause of acquired immunodeficiency syndrome [AIDS]).   Tranexamic Acid: A medication prescribed to treat or prevent heavy bleeding.   Uterus: A muscular organ located in the female pelvis that contains and nourishes the developing fetus during pregnancy.  Endometrial Biopsy   WHAT YOU NEED TO KNOW:   Endometrial biopsy is a procedure to remove a tissue sample from the lining of your uterus. This procedure is done through your vagina.        DISCHARGE INSTRUCTIONS:   Call your doctor or surgeon if:   You have severe pain that does not go away after you take  pain medicine.    You have a fever.    You have pain or cramping that lasts longer than a few days.     You have white or yellow vaginal discharge.     You have more vaginal bleeding than you were told to expect.     You have questions or concerns about your condition or care.    Medicines:   NSAIDs , such as ibuprofen, help decrease swelling, pain, and fever. NSAIDs can cause stomach bleeding or kidney problems in certain people. If you take blood thinner medicine, always ask your healthcare provider if NSAIDs are safe for you. Always read the medicine label and follow directions.    Take your medicine as directed.  Contact your healthcare provider if you think your medicine is not helping or if you have side effects. Tell your provider if you are allergic to any medicine. Keep a list of the medicines, vitamins, and herbs you take. Include the amounts, and when and why you take them. Bring the list or the pill bottles to follow-up visits. Carry your medicine list with you in case of an emergency.    Do not have sex, douche, or use a tampon  for at least 10 to 14 days.  Follow up with your doctor or surgeon as directed:  Write down your questions so you remember to ask them during your visits.  © Copyright [employer] 2023 Information is for End User's use only and may not be sold, redistributed or otherwise used for commercial purposes.  The above information is an  only. It is not intended as medical advice for individual conditions or treatments. Talk to your doctor, nurse or pharmacist before following any medical regimen to see if it is safe and effective for you.

## 2024-06-14 LAB
HPV HR 12 DNA CVX QL NAA+PROBE: NEGATIVE
HPV16 DNA CVX QL NAA+PROBE: NEGATIVE
HPV18 DNA CVX QL NAA+PROBE: NEGATIVE

## 2024-06-17 ENCOUNTER — TELEPHONE (OUTPATIENT)
Age: 54
End: 2024-06-17

## 2024-06-17 NOTE — TELEPHONE ENCOUNTER
Patient called and I was reading her lab results and I asked about her insurance.  She said it is [employer] and it is the id number [id number] and she said she called the insurance company and they said it is  active.  Please try on your end at your convenience and if there are any problems please call patient back.  I did tell the patient she might want to call her insurance company again and she asked if the office can. She can be reached at [phone] . Thank you

## 2024-06-19 LAB
LAB AP GYN PRIMARY INTERPRETATION: NORMAL
Lab: NORMAL

## 2024-06-19 NOTE — TELEPHONE ENCOUNTER
Patient was called and left a detailed voice message in regards  to her insurance. Informed patient that the office does not call insurances due to inactivity and that she would need to call her insurance again. Informed her to bring the insurance card to her next appt and we can update it in our system.

## 2024-06-24 ENCOUNTER — TELEPHONE (OUTPATIENT)
Age: 54
End: 2024-06-24

## 2024-06-24 ENCOUNTER — HOSPITAL ENCOUNTER (OUTPATIENT)
Dept: ULTRASOUND IMAGING | Facility: HOSPITAL | Age: 54
Discharge: HOME | End: 2024-06-24
Attending: OBSTETRICS & GYNECOLOGY
Payer: COMMERCIAL

## 2024-06-24 DIAGNOSIS — N92.1 MENORRHAGIA WITH IRREGULAR CYCLE: ICD-10-CM

## 2024-06-24 DIAGNOSIS — N95.1 PERIMENOPAUSAL: ICD-10-CM

## 2024-06-24 DIAGNOSIS — N88.8 CERVICAL MASS: Primary | ICD-10-CM

## 2024-06-24 PROCEDURE — 76856 US EXAM PELVIC COMPLETE: CPT

## 2024-06-24 PROCEDURE — 76830 TRANSVAGINAL US NON-OB: CPT

## 2024-06-24 NOTE — TELEPHONE ENCOUNTER
US reviewed.  FSH non menopausal  Lining of uterus 9 mm is normal range non menopausal  EMB recommended and is scheduled  Pelvic MRI is ordered for cervical mass

## 2024-06-24 NOTE — TELEPHONE ENCOUNTER
Per Madelaine with [facility] radiology-significant findings on pelvic ultrasound from 6/24

## 2024-06-30 NOTE — PROGRESS NOTES
Assessment/Plan:    Problem List Items Addressed This Visit    None  Visit Diagnoses       Menorrhagia with irregular cycle    -  Primary    Relevant Medications    Tranexamic Acid 650 MG TABS    Other Relevant Orders    Tissue Exam          Patient with cervical mass, nabothian cyst versus fibroid.  She was ordered a pelvic MRI and we will call her with pelvic MRI results.  Discussed with patient pelvic ultrasound results that show normal endometrium.  FSH is non menopausal.  Discussed recommendations and inductions for endometrial sampling via endometrial biopsy in office.  Discussed indications such as abnormal uterine bleeding, postmenopausal bleeding,  abnormal Pap,  monitoring for history of endometrial hyperplasia.  I described the procedure in detail.   Verbal consent obtained.  Discussed  potential risks associated with procedure including cramping or pain, vasovagal reactions,  Patient was instructed to call if with fever, cramping, continued abdominal pain, increasing pain, foul-smelling vaginal discharge or bleeding heavier than a normal period.   Will call patient with endometrial biopsy results.    Subjective   Patient ID: Sho Harrison is a 54 y.o. female.  Patient is here for a follow-up.    Chief Complaint   Patient presents with    Procedure     EMB     She has heavy periods that are irregular.  She would have no periods for 3-4 months then would bleed heavily, bleeding through a pad and underwear.  She is on Tranexamic acid  She is not sexually active.  Menstrual History:  OB History          4    Para   0    Term   0       0    AB   1    Living   3         SAB   1    IAB   0    Ectopic   0    Multiple   0    Live Births   3           Obstetric Comments   Menarche: 13              Menarche age: 13  Patient's last menstrual period was 2024 (approximate).         No past medical history on file.    Past Surgical History:   Procedure Laterality Date     SECTION          Social History     Tobacco Use    Smoking status: Never     Passive exposure: Never    Smokeless tobacco: Never   Vaping Use    Vaping status: Never Used   Substance Use Topics    Alcohol use: Not Currently    Drug use: Never       No Known Allergies      Current Outpatient Medications:     Tranexamic Acid 650 MG TABS, Take 2 tablets (1,300 mg total) by mouth 3 (three) times a day as needed (heavy menses), Disp: 30 tablet, Rfl: 7    cholecalciferol (VITAMIN D3) 1,000 units tablet, Take 1,000 Units by mouth daily, Disp: , Rfl:     escitalopram (LEXAPRO) 10 mg tablet, Take 10 mg by mouth daily, Disp: , Rfl:     miSOPROStol (Cytotec) 200 mcg tablet, Take 1 tablet orally the night before the procedure and one tablet orally the morning of procedure, Disp: 2 tablet, Rfl: 0    multivitamin (THERAGRAN) TABS, Take 1 tablet by mouth daily, Disp: , Rfl:     Pertinent laboratory testing, imaging studies and prior external records reviewed    Review of Systems   Constitutional: Negative.    HENT: Negative.     Eyes: Negative.    Respiratory: Negative.     Cardiovascular: Negative.    Gastrointestinal: Negative.    Endocrine: Negative.    Genitourinary:         As noted in HPI   Musculoskeletal: Negative.    Skin: Negative.    Allergic/Immunologic: Negative.    Neurological: Negative.    Hematological: Negative.    Psychiatric/Behavioral: Negative.           /80   Pulse 93   Ht 5' (1.524 m)   Wt 88 kg (194 lb)   LMP 06/24/2024 (Approximate)   SpO2 98%   BMI 37.89 kg/m²       Physical Exam  Constitutional:       General: She is not in acute distress.     Appearance: She is well-developed.   Genitourinary:      Bladder and urethral meatus normal.      No lesions in the vagina.      Right Labia: No rash, tenderness, lesions, skin changes or Bartholin's cyst.     Left Labia: No tenderness, lesions, skin changes, Bartholin's cyst or rash.     No vaginal discharge, erythema, tenderness or bleeding.      No vaginal  prolapse present.     No vaginal atrophy present.       Right Adnexa: not tender, not full and no mass present.     Left Adnexa: not tender, not full and no mass present.     No cervical polyp.      Uterus is not enlarged or tender.      No uterine mass detected.     Pelvic exam was performed with patient in the lithotomy position.   Rectum:      No tenderness.   Cardiovascular:      Rate and Rhythm: Normal rate.   Pulmonary:      Effort: Pulmonary effort is normal.   Abdominal:      General: There is no distension.      Palpations: Abdomen is soft.      Tenderness: There is no abdominal tenderness.   Neurological:      Mental Status: She is alert and oriented to person, place, and time.   Skin:     General: Skin is warm and dry.   Psychiatric:         Behavior: Behavior normal.           I have spent a total time of 20 minutes on 07/02/24 in caring for this patient including Diagnostic results, Prognosis, Risks and benefits of tx options, Instructions for management, Patient and family education, Impressions, Counseling / Coordination of care, Documenting in the medical record, Reviewing / ordering tests, medicine, procedures  , and Obtaining or reviewing history  .     Future Appointments   Date Time Provider Department Center   7/10/2024 11:15 AM PETEY Heck [facility] [place]   7/22/2024  9:30 AM OW MRI 1 OW MRI [facility]   6/5/2025  8:30 AM [facility] 1 [facility]       H/o polypectomy and D and C 9/2022  Dr. Marquez    She reports irregular periods.    FSH 6.5     6/13/24  PAP NILM neg HPV      Study Result    Narrative & Impression   PELVIC ULTRASOUND, COMPLETE     INDICATION: The patient is 54 years old. N92.1: Excessive and frequent menstruation with irregular cycle  N95.1: Menopausal and female climacteric states.     COMPARISON: None     TECHNIQUE: Transabdominal pelvic ultrasound was performed in sagittal and transverse planes with a curvilinear transducer. Additional transvaginal imaging  was performed to better evaluate the endometrium and ovaries. Imaging included volumetric sweeps as   well as traditional still imaging technique.     FINDINGS:     UTERUS:  The uterus is anteverted in position, measuring 11.9 x 7.0 x 9.2 cm.  The uterus has a normal contour and echotexture.  There is a complex structure measured in the cervix at 1.1 x 1.4 x 1.6 cm. It contains internal echogenic foci which could represent calcifications in a fibroid or debris in a nabothian cyst. No internal vascularity.     ENDOMETRIUM:  The endometrial echo complex has an AP caliber of 9.0 mm.  Appearance within normal limits.     OVARIES/ADNEXA:  Right ovary: 2.2 x 2.0 x 1.4 cm. 3.2 mL.  Ovarian Doppler flow is within normal limits.  No suspicious ovarian or adnexal abnormality.     Left ovary: 4.0 x 2.7 x 1.6 cm. 9.5 mL.  Ovarian Doppler flow is within normal limits.  No suspicious ovarian or adnexal abnormality.     OTHER:  No free fluid or loculated fluid collections.     IMPRESSION:     Endometrial stripe measures 9 mm which is within normal range if the patient is still menstruating.     Complex cervical lesion measuring up to 1.6 cm as described above. This may represent a fibroid or a complex nabothian cyst with debris. Follow-up options include pelvic MRI for follow-up ultrasound in 3 to 6 months. Speculum exam may also be helpful.     The study was marked in EPIC for significant notification.                          Workstation performed: [id number]        Imaging    US pelvis complete w transvaginal (Order: [id number]) - 6/24/2024    Result History    US pelvis complete w transvaginal (Order #[id number]) on 6/24/2024 - Order Result History Report    Order Report     Order Details  Order Questions    Question Answer   Exam reason AUB   Note: Enter reason for exam   Pregnant? No            SURGICAL PATHOLOGY  Specimen: Tissue - Cervical polyp specimen (specimen), Tissue section (specimen) - Tissue specimen from  end...  Component 1 yr ago   Surgical Pathology [facility]  [address] [place] [address], [place] [id number] [phone]      MR#:             [id number]  Patient:         RASHID BLAS  /Sex:         1970  F  Provider:        RANDELL ABERNATHY  Location:        [id number] (17)  Collect Date:    2022  [id number]    DIAGNOSIS  :  A. CERVICAL POLYP, POLYPECTOMY:  Benign endocervical-lower uterine segment polyp; negative for  atypia/malignancy.    B. ENDOMETRIAL POLYP, POLYPECTOMY:  Fragments of benign endometrial polyp and secretory endometrium;  negative for atypia/malignancy.          Electronically Signed Out by  Katherin Spears M.D.          Finalized:  2022  Billing Fee Codes:  A: [id number]  B: [id number]  Electronically Released at: [address] [place] [address]. [place] [address]  : Ada Clifford, PhD      Clinical History and Preoperative Diagnosis:  Menorrhagia with irregular cycles N 92.1            GROSS DESCRIPTION:  A. The specimen is submitted in a container labeled with the patient's  name and date of birth and identified as cervical polyp on the  accompanying protocol and cervical polyp on the specimen container.  The  specimen is submitted in formalin and consists of a single tan-pink  polypoid soft tissue fragment that is 3.9 x 2.1 x 1.3 cm.  The margin of  resection is painted with ink.  The entire specimen is serially  sectioned revealing gelatinous material and submitted in 5 cassettes.  Cold Ischemia Time: < 1 hour    B. The specimen is submitted in a container labeled with the patient's  name and date of birth and identified as endometrium polypectomy on the  accompanying protocol and endometrium polypectomy on the specimen  container.  The specimen is submitted in formalin and consists of an  aggregate of tan-white, irregular soft tissue fragments measuring 1.5 x  1.0 x 0.2 cm.  The entire specimen is submitted in one cassette.  Cold  Ischemia Time: < 1 hour    lz   9/20/2022  LZ    Stain Notes:  For H and E stained sections, a pathologist has verified the stain as  acceptable.        One or more of the stains performed may have been developed and its  performance characteristics determined by either [facility] or an  outside reference laboratory. Stains such as this have not been cleared  or approved by the U. S. Food and Drug Administration. The FDA has  determined that such clearance or approval is not necessary. This test  is used for clinical purposes and should not be reported as  investigational or for research. This laboratory and our reference  laboratories are certified under the Clinical Laboratory Improvement  Amendments of 1988 (CLIA) as qualified to perform high complexity  clinical laboratory testing.   Resulting Agency [facility] ([facility])   Specimen Collected: 09/20/22  1:51 PM    Performed by: [facility] ([facility]) Last Resulted: 09/25/22  9:11 PM   Received From: [facility]  Result Received: 01/25/24  7:24 PM    View Encounter      Future Appointments   Date Time Provider Department Center   7/10/2024 11:15 AM PETEY Heck [facility]   7/22/2024  9:30 AM OW MRI 1 OW MRI [facility]   6/5/2025  8:30 AM OW [facility]MO [facility] 1 [facility]

## 2024-07-02 ENCOUNTER — PROCEDURE VISIT (OUTPATIENT)
Dept: OBGYN CLINIC | Facility: CLINIC | Age: 54
End: 2024-07-02
Payer: COMMERCIAL

## 2024-07-02 ENCOUNTER — VBI (OUTPATIENT)
Dept: ADMINISTRATIVE | Facility: OTHER | Age: 54
End: 2024-07-02

## 2024-07-02 VITALS
WEIGHT: 194 LBS | DIASTOLIC BLOOD PRESSURE: 80 MMHG | HEIGHT: 60 IN | HEART RATE: 93 BPM | OXYGEN SATURATION: 98 % | BODY MASS INDEX: 38.09 KG/M2 | SYSTOLIC BLOOD PRESSURE: 142 MMHG

## 2024-07-02 DIAGNOSIS — N88.8 CERVICAL MASS: ICD-10-CM

## 2024-07-02 DIAGNOSIS — N92.1 MENORRHAGIA WITH IRREGULAR CYCLE: Primary | ICD-10-CM

## 2024-07-02 PROCEDURE — 99213 OFFICE O/P EST LOW 20 MIN: CPT | Performed by: OBSTETRICS & GYNECOLOGY

## 2024-07-02 PROCEDURE — 58100 BIOPSY OF UTERUS LINING: CPT | Performed by: OBSTETRICS & GYNECOLOGY

## 2024-07-02 PROCEDURE — 88305 TISSUE EXAM BY PATHOLOGIST: CPT | Performed by: PATHOLOGY

## 2024-07-02 RX ORDER — TRANEXAMIC ACID 650 MG/1
1300 TABLET ORAL 3 TIMES DAILY PRN
Qty: 30 TABLET | Refills: 7 | Status: SHIPPED | OUTPATIENT
Start: 2024-07-02

## 2024-07-02 NOTE — TELEPHONE ENCOUNTER
07/02/24 8:39 AM     Chart reviewed for Mammogram was/were submitted to the patient's insurance.     Leia Somers MA   [facility]

## 2024-07-02 NOTE — TELEPHONE ENCOUNTER
07/02/24 9:15 AM     Chart reviewed for Pap Smear (HPV) aka Cervical Cancer Screening was/were submitted to the patient's insurance.     Leia Somers MA   [facility]

## 2024-07-02 NOTE — PROGRESS NOTES
Endometrial biopsy    Date/Time: 7/2/2024 2:45 PM    Performed by: Kaylee Montaño MD  Authorized by: Kaylee Montaño MD  Universal Protocol:  Procedure performed by:  Consent: Verbal consent obtained.  Patient understanding: patient states understanding of the procedure being performed  Patient consent: the patient's understanding of the procedure matches consent given  Procedure consent: procedure consent matches procedure scheduled    Indication:     Indications: Other disorder of menstruation and other abnormal bleeding from female genital tract    Pre-procedure:     Premeds:  Ibuprofen (Cytotec)  Procedure:     Procedure: endometrial biopsy with Pipelle      A bivalve speculum was placed in the vagina: yes      Cervix cleaned and prepped: yes      Local anesthetic:  Benzocaine spray    The cervix was dilated: yes      Uterus sounded: yes      Uterus sound depth (cm):  8    Curettes used:  1    Specimen collected: specimen collected and sent to pathology      Patient tolerated procedure well with no complications: yes

## 2024-07-05 ENCOUNTER — VBI (OUTPATIENT)
Dept: ADMINISTRATIVE | Facility: OTHER | Age: 54
End: 2024-07-05

## 2024-07-05 PROCEDURE — 88305 TISSUE EXAM BY PATHOLOGIST: CPT | Performed by: PATHOLOGY

## 2024-07-05 NOTE — TELEPHONE ENCOUNTER
07/05/24 11:54 AM     Chart reviewed for CRC: Colonoscopy was/were submitted to the patient's insurance.     Leia Somers MA   [facility]

## 2024-07-10 ENCOUNTER — OFFICE VISIT (OUTPATIENT)
Dept: FAMILY MEDICINE CLINIC | Facility: CLINIC | Age: 54
End: 2024-07-10
Payer: COMMERCIAL

## 2024-07-10 VITALS
OXYGEN SATURATION: 98 % | SYSTOLIC BLOOD PRESSURE: 130 MMHG | HEART RATE: 83 BPM | HEIGHT: 60 IN | DIASTOLIC BLOOD PRESSURE: 80 MMHG | BODY MASS INDEX: 37.69 KG/M2 | WEIGHT: 192 LBS

## 2024-07-10 DIAGNOSIS — D50.0 IRON DEFICIENCY ANEMIA DUE TO CHRONIC BLOOD LOSS: Primary | ICD-10-CM

## 2024-07-10 DIAGNOSIS — D22.9 ATYPICAL MOLE: ICD-10-CM

## 2024-07-10 DIAGNOSIS — D18.03 HEMANGIOMA OF LIVER: ICD-10-CM

## 2024-07-10 DIAGNOSIS — F41.9 ANXIETY: ICD-10-CM

## 2024-07-10 PROCEDURE — 99214 OFFICE O/P EST MOD 30 MIN: CPT | Performed by: NURSE PRACTITIONER

## 2024-07-10 RX ORDER — ESCITALOPRAM OXALATE 10 MG/1
10 TABLET ORAL DAILY
Qty: 90 TABLET | Refills: 2 | Status: SHIPPED | OUTPATIENT
Start: 2024-07-10

## 2024-07-10 NOTE — PROGRESS NOTES
Ambulatory Visit  Name: Sho Harrison      : 1970      MRN: [id number]  Encounter Provider: PETEY Heck  Encounter Date: 7/10/2024   Encounter department: [facility]    Assessment & Plan   1. Iron deficiency anemia due to chronic blood loss  Assessment & Plan:  No issues at this time.   2. Anxiety  Assessment & Plan:  Continue with lexapro    Orders:  -     escitalopram (LEXAPRO) 10 mg tablet; Take 1 tablet (10 mg total) by mouth daily  3. Atypical mole  -     Ambulatory Referral to Dermatology; Future  4. Hemangioma of liver  Assessment & Plan:  Will revisit in 6 months and order RUQ US.    BMI Counseling: Body mass index is 37.5 kg/m². The BMI is above normal. Patient referred to nutritionist due to patient being overweight.    BMI Counseling: Body mass index is 37.5 kg/m². The BMI is above normal. Patient referred to nutritionist. Rationale for BMI follow-up plan is due to patient being overweight or obese.       History of Present Illness     Here today for a follow-up. She is with a hx of anxiety - controlled with SSRI's.  Hx of heavy menstrual bleeding and recent cervical mass found on exam.  Recently had surgery by GYN.          Review of Systems   Constitutional: Negative.    HENT: Negative.     Respiratory: Negative.     Cardiovascular: Negative.    Gastrointestinal: Negative.    Neurological: Negative.    Psychiatric/Behavioral:  The patient is nervous/anxious.    All other systems reviewed and are negative.    Current Outpatient Medications on File Prior to Visit   Medication Sig Dispense Refill    cholecalciferol (VITAMIN D3) 1,000 units tablet Take 1,000 Units by mouth daily      miSOPROStol (Cytotec) 200 mcg tablet Take 1 tablet orally the night before the procedure and one tablet orally the morning of procedure 2 tablet 0    multivitamin (THERAGRAN) TABS Take 1 tablet by mouth daily      Tranexamic Acid 650 MG TABS Take 2 tablets (1,300 mg total) by  mouth 3 (three) times a day as needed (heavy menses) 30 tablet 7    [DISCONTINUED] escitalopram (LEXAPRO) 10 mg tablet Take 10 mg by mouth daily       No current facility-administered medications on file prior to visit.      Objective     /80 (BP Location: Right arm, Patient Position: Sitting, Cuff Size: Extra-Large)   Pulse 83   Ht 5' (1.524 m)   Wt 87.1 kg (192 lb)   LMP 06/24/2024 (Approximate)   SpO2 98%   BMI 37.50 kg/m²     Physical Exam  Vitals and nursing note reviewed.   Constitutional:       General: She is not in acute distress.     Appearance: Normal appearance. She is well-developed.   HENT:      Head: Normocephalic and atraumatic.   Cardiovascular:      Rate and Rhythm: Normal rate and regular rhythm.      Heart sounds: No murmur heard.     No gallop.   Pulmonary:      Effort: Pulmonary effort is normal. No respiratory distress.      Breath sounds: Normal breath sounds.   Musculoskeletal:      Cervical back: Neck supple.   Skin:     General: Skin is warm and dry.   Neurological:      General: No focal deficit present.      Mental Status: She is alert and oriented to person, place, and time. Mental status is at baseline.   Psychiatric:         Mood and Affect: Mood normal.         Behavior: Behavior normal.         Thought Content: Thought content normal.         Judgment: Judgment normal.       Administrative Statements

## 2024-07-22 ENCOUNTER — HOSPITAL ENCOUNTER (OUTPATIENT)
Dept: MRI IMAGING | Facility: HOSPITAL | Age: 54
Discharge: HOME | End: 2024-07-22
Attending: OBSTETRICS & GYNECOLOGY
Payer: COMMERCIAL

## 2024-07-22 DIAGNOSIS — N88.8 CERVICAL MASS: ICD-10-CM

## 2024-07-22 PROCEDURE — 72197 MRI PELVIS W/O & W/DYE: CPT

## 2024-07-22 PROCEDURE — A9585 GADOBUTROL INJECTION: HCPCS | Performed by: OBSTETRICS & GYNECOLOGY

## 2024-07-22 RX ORDER — GADOBUTROL 604.72 MG/ML
8 INJECTION INTRAVENOUS
Status: COMPLETED | OUTPATIENT
Start: 2024-07-22 | End: 2024-07-22

## 2024-07-22 RX ADMIN — GADOBUTROL 8 ML: 604.72 INJECTION INTRAVENOUS at 10:35

## 2024-07-29 ENCOUNTER — TELEPHONE (OUTPATIENT)
Dept: OTHER | Facility: HOSPITAL | Age: 54
End: 2024-07-29

## 2024-07-29 DIAGNOSIS — K86.2 PANCREATIC CYST: Primary | ICD-10-CM

## 2024-07-29 DIAGNOSIS — N83.209 CYST OF OVARY, UNSPECIFIED LATERALITY: ICD-10-CM

## 2024-08-15 ENCOUNTER — HOSPITAL ENCOUNTER (OUTPATIENT)
Dept: MRI IMAGING | Facility: HOSPITAL | Age: 54
End: 2024-08-15
Attending: OBSTETRICS & GYNECOLOGY
Payer: COMMERCIAL

## 2024-08-15 DIAGNOSIS — K86.2 PANCREATIC CYST: ICD-10-CM

## 2024-08-15 PROCEDURE — A9585 GADOBUTROL INJECTION: HCPCS | Performed by: OBSTETRICS & GYNECOLOGY

## 2024-08-15 PROCEDURE — 74183 MRI ABD W/O CNTR FLWD CNTR: CPT

## 2024-08-15 RX ORDER — GADOBUTROL 604.72 MG/ML
9 INJECTION INTRAVENOUS
Status: COMPLETED | OUTPATIENT
Start: 2024-08-15 | End: 2024-08-15

## 2024-08-15 RX ADMIN — GADOBUTROL 9 ML: 604.72 INJECTION INTRAVENOUS at 12:00

## 2024-08-19 ENCOUNTER — TELEPHONE (OUTPATIENT)
Dept: OBGYN CLINIC | Facility: CLINIC | Age: 54
End: 2024-08-19

## 2024-08-19 DIAGNOSIS — D18.03 HEMANGIOMA OF LIVER: ICD-10-CM

## 2024-08-19 DIAGNOSIS — K86.2 PANCREATIC CYST: Primary | ICD-10-CM

## 2024-08-19 NOTE — TELEPHONE ENCOUNTER
Patient called in with some questions regarding test results. Patient states she did some research and also discussed with her sister's father in law who is an oncologist regarding hemangioma. Patient wondering if Dr. Montaño placed surgical oncology referral because she thinks mass may be cancer. Informed patient provider did not specify that she believes mass may be cancer, but that surgical oncology would be able to follow-up and provider their recommendations due to results being outside of OBGYN scope.     Patient also asking if surgical oncology will be reaching out to patient to scheduled. RN confirmed their office should assist in scheduling. Patient verbalized understanding.  Denies further questions at this time.

## 2024-08-19 NOTE — TELEPHONE ENCOUNTER
Tried calling patient  Will send message to the pod and sent patient a MyChart message    MRI results were reviewed, she has small pancreatic cysts that at follow-up MRI is recommended in 1 year.  Radiology is recommending MRI or MRCP with and without IV contrast.  She also has a small hemangioma.    For this, I would recommend she sees and  follows up with surgical oncology to determine appropriate follow-up interval and testing modality.  Will not be ordering to repeat MRI for now.  I will place a referral to surgical oncology instead

## 2024-09-04 PROBLEM — K86.2 PANCREAS CYST: Status: ACTIVE | Noted: 2024-09-04

## 2024-09-06 ENCOUNTER — CONSULT (OUTPATIENT)
Dept: SURGICAL ONCOLOGY | Facility: CLINIC | Age: 54
End: 2024-09-06
Payer: COMMERCIAL

## 2024-09-06 VITALS
OXYGEN SATURATION: 99 % | WEIGHT: 197 LBS | HEIGHT: 60 IN | TEMPERATURE: 97.3 F | HEART RATE: 82 BPM | SYSTOLIC BLOOD PRESSURE: 120 MMHG | BODY MASS INDEX: 38.68 KG/M2 | DIASTOLIC BLOOD PRESSURE: 80 MMHG

## 2024-09-06 DIAGNOSIS — D18.03 HEMANGIOMA OF LIVER: ICD-10-CM

## 2024-09-06 DIAGNOSIS — K86.2 PANCREATIC CYST: ICD-10-CM

## 2024-09-06 DIAGNOSIS — K86.2 PANCREAS CYST: Primary | ICD-10-CM

## 2024-09-06 PROCEDURE — 99243 OFF/OP CNSLTJ NEW/EST LOW 30: CPT | Performed by: SURGERY

## 2024-09-06 NOTE — LETTER
September 6, 2024     PETEY Heck  [address] [facility] [place] [address]    Patient: Sho Harrison   YOB: 1970   Date of Visit: 9/6/2024       Dear Dr. Garcia:    Thank you for referring Sho Harrison to me for evaluation. Below are my notes for this consultation.    If you have questions, please do not hesitate to call me. I look forward to following your patient along with you.         Sincerely,        Israel Pennington MD        CC: MD Israel Jacques MD  9/6/2024  9:45 AM  Sign when Signing Visit               Surgical Oncology Consult       [address] [facility]  [facility] SURGICAL ONCOLOGY [place]  [address] [facility] [address]  [facility] [address]    Sho Harrison  1970  [id number]  [address]  [facility] SURGICAL ONCOLOGY [place]  [address] [facility] [address]    Chief Complaint   Patient presents with   • Consult       Assessment/Plan:    No problem-specific Assessment & Plan notes found for this encounter.       Diagnoses and all orders for this visit:    Pancreas cyst  -     MRI abdomen w wo contrast and mrcp; Future  -     Basic metabolic panel; Future    Hemangioma of liver  -     Ambulatory Referral to Surgical Oncology  -     MRI abdomen w wo contrast and mrcp; Future  -     Basic metabolic panel; Future    Pancreatic cyst  -     Ambulatory Referral to Surgical Oncology      Advance Care Planning/Advance Directives:  Discussed disease status, cancer treatment plans and/or cancer treatment goals with the patient.     Oncology History    No history exists.       History of Present Illness: 54-year-old woman who is being worked up for Gyn issues.  She underwent MRI of the abdomen which revealed pancreatic cysts as well as a liver hemangioma.  She has been referred for further workup.  She has no GI complaints.  No history of pancreatic malignancies.  No personal history of new onset diabetes.  No  history of jaundice.  No bowel changes.    Review of Systems   Constitutional: Negative.    HENT: Negative.     Eyes: Negative.    Respiratory: Negative.     Cardiovascular: Negative.    Gastrointestinal: Negative.    Endocrine: Negative.    Genitourinary: Negative.    Musculoskeletal: Negative.    Skin: Negative.    Allergic/Immunologic: Negative.    Neurological: Negative.    Hematological: Negative.    Psychiatric/Behavioral: Negative.     All other systems reviewed and are negative.        Patient Active Problem List   Diagnosis   • Anxiety   • Diverticulitis   • Elevated antinuclear antibody (PEACE) level   • Hemangioma of liver   • Hyperlipidemia   • Iron deficiency anemia   • Leg cramping   • Pancreas cyst     No past medical history on file.  Past Surgical History:   Procedure Laterality Date   •  SECTION       Family History   Problem Relation Age of Onset   • No Known Problems Mother    • No Known Problems Father    • No Known Problems Sister    • No Known Problems Sister    • No Known Problems Daughter    • Breast cancer Maternal Grandmother         late 60's   • No Known Problems Maternal Grandfather    • No Known Problems Paternal Grandmother    • No Known Problems Paternal Grandfather    • No Known Problems Maternal Aunt    • No Known Problems Maternal Aunt    • No Known Problems Maternal Aunt    • Colon cancer Paternal Uncle      Social History     Socioeconomic History   • Marital status:      Spouse name: Not on file   • Number of children: Not on file   • Years of education: Not on file   • Highest education level: Not on file   Occupational History   • Not on file   Tobacco Use   • Smoking status: Never     Passive exposure: Never   • Smokeless tobacco: Never   Vaping Use   • Vaping status: Never Used   Substance and Sexual Activity   • Alcohol use: Not Currently   • Drug use: Never   • Sexual activity: Not Currently   Other Topics Concern   • Not on file   Social History Narrative    • Not on file     Social Determinants of Health     Financial Resource Strain: Not on file   Food Insecurity: Not on file   Transportation Needs: Not on file   Physical Activity: Not on file   Stress: Not on file   Social Connections: Not on file   Intimate Partner Violence: Not on file   Housing Stability: Not on file       Current Outpatient Medications:   •  escitalopram (LEXAPRO) 10 mg tablet, Take 1 tablet (10 mg total) by mouth daily, Disp: 90 tablet, Rfl: 2  •  Tranexamic Acid 650 MG TABS, Take 2 tablets (1,300 mg total) by mouth 3 (three) times a day as needed (heavy menses), Disp: 30 tablet, Rfl: 7  •  cholecalciferol (VITAMIN D3) 1,000 units tablet, Take 1,000 Units by mouth daily (Patient not taking: Reported on 9/6/2024), Disp: , Rfl:   •  miSOPROStol (Cytotec) 200 mcg tablet, Take 1 tablet orally the night before the procedure and one tablet orally the morning of procedure (Patient not taking: Reported on 9/6/2024), Disp: 2 tablet, Rfl: 0  •  multivitamin (THERAGRAN) TABS, Take 1 tablet by mouth daily (Patient not taking: Reported on 9/6/2024), Disp: , Rfl:   No Known Allergies  Vitals:    09/06/24 0904   BP: 120/80   Pulse: 82   Temp: (!) 97.3 °F (36.3 °C)   SpO2: 99%       Physical Exam  Vitals reviewed.   Constitutional:       Appearance: Normal appearance.   HENT:      Head: Normocephalic and atraumatic.      Right Ear: External ear normal.      Left Ear: External ear normal.      Mouth/Throat:      Mouth: Mucous membranes are moist.   Eyes:      Extraocular Movements: Extraocular movements intact.      Pupils: Pupils are equal, round, and reactive to light.   Cardiovascular:      Rate and Rhythm: Normal rate and regular rhythm.      Heart sounds: Normal heart sounds.   Pulmonary:      Effort: Pulmonary effort is normal.      Breath sounds: Normal breath sounds.   Abdominal:      General: Abdomen is flat.      Palpations: Abdomen is soft.   Musculoskeletal:         General: Normal range of motion.       Cervical back: Normal range of motion and neck supple.   Skin:     General: Skin is warm and dry.   Neurological:      General: No focal deficit present.      Mental Status: She is alert and oriented to person, place, and time.   Psychiatric:         Mood and Affect: Mood normal.         Behavior: Behavior normal.         Pathology:  none    Labs:  Lab Results   Component Value Date    SODIUM 139 05/18/2024    K 4.6 05/18/2024     05/18/2024    CO2 28 05/18/2024    AGAP 7 05/18/2024    BUN 13 05/18/2024    CREATININE 0.59 (L) 05/18/2024    GLUC 87 10/21/2022    GLUF 98 05/18/2024    CALCIUM 9.4 05/18/2024    AST 19 05/18/2024    ALT 21 05/18/2024    ALKPHOS 71 05/18/2024    TP 7.5 05/18/2024    TBILI 0.42 05/18/2024    EGFR 104 05/18/2024         Imaging  MRI abdomen w wo contrast and mrcp    Result Date: 8/19/2024  Narrative: MRI OF THE ABDOMEN WITH AND WITHOUT CONTRAST WITH MRCP INDICATION: 54 years / Female. K86.2: Cyst of pancreas. Potential pancreatic cyst seen on MRI pelvis 7/22/2024. COMPARISON: MRI pelvis 7/22/2024. Outside CT abdomen pelvis report 7/6/2022 available in [facility] Everywhere. TECHNIQUE: Multiplanar/multisequence MRI of the abdomen with 3D MRCP was performed before and after administration of contrast. DIAGNOSTIC QUALITY: Right upper quadrant susceptibility artifact likely secondary to surgical clips obscures portions of the liver and gallbladder. IV Contrast: 9 mL of Gadobutrol injection (SINGLE-DOSE) FINDINGS: LOWER CHEST: Unremarkable. LIVER: Severe hepatic steatosis. No suspicious mass. 2.3 cm T2 hyperintense segment 2 hepatic lesion showing progressive enhancement keeping with a benign hemangioma, #6/13 and 13/36. Scattered subcentimeter simple cysts. Patent hepatic and portal veins. BILE DUCTS: No intrahepatic or extrahepatic bile duct dilation. Common bile duct is normal in caliber. No choledocholithiasis, biliary stricture or suspicious mass. GALLBLADDER: Normal. PANCREAS:  Mildly atrophic. Scattered subcentimeter pancreatic cysts measuring up to 6 mm in the pancreatic body #7/19. No suspicious components. No pancreatic ductal dilation. ADRENAL GLANDS: Unremarkable. SPLEEN: Normal. KIDNEYS/PROXIMAL URETERS: No hydroureteronephrosis. No suspicious renal mass. Simple cyst. Thinly septated 2.2 cm right renal Bosniak 2 cyst #7/26. Other simple Bosniak 1 renal cysts. BOWEL: No dilated loops of bowel. PERITONEUM/RETROPERITONEUM: No ascites. LYMPH NODES: No abdominal lymphadenopathy. VESSELS: No aneurysm. ABDOMINAL WALL: Unremarkable BONES: No suspicious osseous lesion.     Impression: 6 mm and smaller pancreatic cysts without suspicious components. For simple cyst(s) less than 1.5 cm, recommend yearly follow-up 5 times, then every 2 years for 2 times. If cyst(s) stable after 9 years, no further follow-ups. Recommend next follow-up in 1 year. Preferred imaging modality: abdomen MRI and MRCP with and without IV contrast, or triple phase abdomen CT with IV contrast, or abdomen MRI and MRCP without IV contrast. Workstation performed: [id number]     I reviewed the above laboratory and imaging data.    Discussion/Summary: 54-year-old woman, insignificant pancreatic cyst.  No worrisome features.  Will plan on MRI/MRCP in 1 year to follow-up and to keep track of hepatic hemangioma as well.  All questions answered and copy of path report given to her for her records.

## 2024-09-06 NOTE — PROGRESS NOTES
Surgical Oncology Consult       [address]  [facility]  [address]  [facility] [address]    Sho Harrison  1970  [id number]  [address]  [facility] SURGICAL ONCOLOGY [place]  [address] [place] [address]    Chief Complaint   Patient presents with    Consult       Assessment/Plan:    No problem-specific Assessment & Plan notes found for this encounter.       Diagnoses and all orders for this visit:    Pancreas cyst  -     MRI abdomen w wo contrast and mrcp; Future  -     Basic metabolic panel; Future    Hemangioma of liver  -     Ambulatory Referral to Surgical Oncology  -     MRI abdomen w wo contrast and mrcp; Future  -     Basic metabolic panel; Future    Pancreatic cyst  -     Ambulatory Referral to Surgical Oncology      Advance Care Planning/Advance Directives:  Discussed disease status, cancer treatment plans and/or cancer treatment goals with the patient.     Oncology History    No history exists.       History of Present Illness: 54-year-old woman who is being worked up for Gyn issues.  She underwent MRI of the abdomen which revealed pancreatic cysts as well as a liver hemangioma.  She has been referred for further workup.  She has no GI complaints.  No history of pancreatic malignancies.  No personal history of new onset diabetes.  No history of jaundice.  No bowel changes.    Review of Systems   Constitutional: Negative.    HENT: Negative.     Eyes: Negative.    Respiratory: Negative.     Cardiovascular: Negative.    Gastrointestinal: Negative.    Endocrine: Negative.    Genitourinary: Negative.    Musculoskeletal: Negative.    Skin: Negative.    Allergic/Immunologic: Negative.    Neurological: Negative.    Hematological: Negative.    Psychiatric/Behavioral: Negative.     All other systems reviewed and are negative.        Patient Active Problem List   Diagnosis    Anxiety    Diverticulitis     Elevated antinuclear antibody (PEACE) level    Hemangioma of liver    Hyperlipidemia    Iron deficiency anemia    Leg cramping    Pancreas cyst     No past medical history on file.  Past Surgical History:   Procedure Laterality Date     SECTION       Family History   Problem Relation Age of Onset    No Known Problems Mother     No Known Problems Father     No Known Problems Sister     No Known Problems Sister     No Known Problems Daughter     Breast cancer Maternal Grandmother         late 60's    No Known Problems Maternal Grandfather     No Known Problems Paternal Grandmother     No Known Problems Paternal Grandfather     No Known Problems Maternal Aunt     No Known Problems Maternal Aunt     No Known Problems Maternal Aunt     Colon cancer Paternal Uncle      Social History     Socioeconomic History    Marital status:      Spouse name: Not on file    Number of children: Not on file    Years of education: Not on file    Highest education level: Not on file   Occupational History    Not on file   Tobacco Use    Smoking status: Never     Passive exposure: Never    Smokeless tobacco: Never   Vaping Use    Vaping status: Never Used   Substance and Sexual Activity    Alcohol use: Not Currently    Drug use: Never    Sexual activity: Not Currently   Other Topics Concern    Not on file   Social History Narrative    Not on file     Social Determinants of Health     Financial Resource Strain: Not on file   Food Insecurity: Not on file   Transportation Needs: Not on file   Physical Activity: Not on file   Stress: Not on file   Social Connections: Not on file   Intimate Partner Violence: Not on file   Housing Stability: Not on file       Current Outpatient Medications:     escitalopram (LEXAPRO) 10 mg tablet, Take 1 tablet (10 mg total) by mouth daily, Disp: 90 tablet, Rfl: 2    Tranexamic Acid 650 MG TABS, Take 2 tablets (1,300 mg total) by mouth 3 (three) times a day as needed (heavy menses), Disp: 30 tablet,  Rfl: 7    cholecalciferol (VITAMIN D3) 1,000 units tablet, Take 1,000 Units by mouth daily (Patient not taking: Reported on 9/6/2024), Disp: , Rfl:     miSOPROStol (Cytotec) 200 mcg tablet, Take 1 tablet orally the night before the procedure and one tablet orally the morning of procedure (Patient not taking: Reported on 9/6/2024), Disp: 2 tablet, Rfl: 0    multivitamin (THERAGRAN) TABS, Take 1 tablet by mouth daily (Patient not taking: Reported on 9/6/2024), Disp: , Rfl:   No Known Allergies  Vitals:    09/06/24 0904   BP: 120/80   Pulse: 82   Temp: (!) 97.3 °F (36.3 °C)   SpO2: 99%       Physical Exam  Vitals reviewed.   Constitutional:       Appearance: Normal appearance.   HENT:      Head: Normocephalic and atraumatic.      Right Ear: External ear normal.      Left Ear: External ear normal.      Mouth/Throat:      Mouth: Mucous membranes are moist.   Eyes:      Extraocular Movements: Extraocular movements intact.      Pupils: Pupils are equal, round, and reactive to light.   Cardiovascular:      Rate and Rhythm: Normal rate and regular rhythm.      Heart sounds: Normal heart sounds.   Pulmonary:      Effort: Pulmonary effort is normal.      Breath sounds: Normal breath sounds.   Abdominal:      General: Abdomen is flat.      Palpations: Abdomen is soft.   Musculoskeletal:         General: Normal range of motion.      Cervical back: Normal range of motion and neck supple.   Skin:     General: Skin is warm and dry.   Neurological:      General: No focal deficit present.      Mental Status: She is alert and oriented to person, place, and time.   Psychiatric:         Mood and Affect: Mood normal.         Behavior: Behavior normal.         Pathology:  none    Labs:  Lab Results   Component Value Date    SODIUM 139 05/18/2024    K 4.6 05/18/2024     05/18/2024    CO2 28 05/18/2024    AGAP 7 05/18/2024    BUN 13 05/18/2024    CREATININE 0.59 (L) 05/18/2024    GLUC 87 10/21/2022    GLUF 98 05/18/2024    CALCIUM 9.4  05/18/2024    AST 19 05/18/2024    ALT 21 05/18/2024    ALKPHOS 71 05/18/2024    TP 7.5 05/18/2024    TBILI 0.42 05/18/2024    EGFR 104 05/18/2024         Imaging  MRI abdomen w wo contrast and mrcp    Result Date: 8/19/2024  Narrative: MRI OF THE ABDOMEN WITH AND WITHOUT CONTRAST WITH MRCP INDICATION: 54 years / Female. K86.2: Cyst of pancreas. Potential pancreatic cyst seen on MRI pelvis 7/22/2024. COMPARISON: MRI pelvis 7/22/2024. Outside CT abdomen pelvis report 7/6/2022 available in [facility] Everywhere. TECHNIQUE: Multiplanar/multisequence MRI of the abdomen with 3D MRCP was performed before and after administration of contrast. DIAGNOSTIC QUALITY: Right upper quadrant susceptibility artifact likely secondary to surgical clips obscures portions of the liver and gallbladder. IV Contrast: 9 mL of Gadobutrol injection (SINGLE-DOSE) FINDINGS: LOWER CHEST: Unremarkable. LIVER: Severe hepatic steatosis. No suspicious mass. 2.3 cm T2 hyperintense segment 2 hepatic lesion showing progressive enhancement keeping with a benign hemangioma, #6/13 and 13/36. Scattered subcentimeter simple cysts. Patent hepatic and portal veins. BILE DUCTS: No intrahepatic or extrahepatic bile duct dilation. Common bile duct is normal in caliber. No choledocholithiasis, biliary stricture or suspicious mass. GALLBLADDER: Normal. PANCREAS: Mildly atrophic. Scattered subcentimeter pancreatic cysts measuring up to 6 mm in the pancreatic body #7/19. No suspicious components. No pancreatic ductal dilation. ADRENAL GLANDS: Unremarkable. SPLEEN: Normal. KIDNEYS/PROXIMAL URETERS: No hydroureteronephrosis. No suspicious renal mass. Simple cyst. Thinly septated 2.2 cm right renal Bosniak 2 cyst #7/26. Other simple Bosniak 1 renal cysts. BOWEL: No dilated loops of bowel. PERITONEUM/RETROPERITONEUM: No ascites. LYMPH NODES: No abdominal lymphadenopathy. VESSELS: No aneurysm. ABDOMINAL WALL: Unremarkable BONES: No suspicious osseous lesion.      Impression: 6 mm and smaller pancreatic cysts without suspicious components. For simple cyst(s) less than 1.5 cm, recommend yearly follow-up 5 times, then every 2 years for 2 times. If cyst(s) stable after 9 years, no further follow-ups. Recommend next follow-up in 1 year. Preferred imaging modality: abdomen MRI and MRCP with and without IV contrast, or triple phase abdomen CT with IV contrast, or abdomen MRI and MRCP without IV contrast. Workstation performed: [id number]     I reviewed the above laboratory and imaging data.    Discussion/Summary: 54-year-old woman, insignificant pancreatic cyst.  No worrisome features.  Will plan on MRI/MRCP in 1 year to follow-up and to keep track of hepatic hemangioma as well.  All questions answered and copy of path report given to her for her records.

## 2024-09-16 ENCOUNTER — VBI (OUTPATIENT)
Dept: ADMINISTRATIVE | Facility: OTHER | Age: 54
End: 2024-09-16

## 2024-09-21 ENCOUNTER — HOSPITAL ENCOUNTER (OUTPATIENT)
Dept: ULTRASOUND IMAGING | Facility: HOSPITAL | Age: 54
Discharge: HOME | End: 2024-09-21
Attending: OBSTETRICS & GYNECOLOGY
Payer: COMMERCIAL

## 2024-09-21 DIAGNOSIS — N83.209 CYST OF OVARY, UNSPECIFIED LATERALITY: ICD-10-CM

## 2024-09-21 PROCEDURE — 76856 US EXAM PELVIC COMPLETE: CPT

## 2024-09-21 PROCEDURE — 76830 TRANSVAGINAL US NON-OB: CPT

## 2024-09-24 ENCOUNTER — TELEPHONE (OUTPATIENT)
Age: 54
End: 2024-09-24

## 2024-09-24 NOTE — TELEPHONE ENCOUNTER
----- Message from Kaylee Montaño MD sent at 9/24/2024  1:42 PM EDT -----  Notify patient that her ultrasound shows that the cyst on the right ovary has resolved.  The lining of the uterus is still thick at 16 mm.  I would like to follow-up and discussed with her her symptoms and results and additional testing.  Please set patient up for an appointment in person or virtually.   No significant past surgical history

## 2024-09-25 NOTE — TELEPHONE ENCOUNTER
Pt returned phone call. Reviewed result note from Dr. Montaño and pt verbalized understanding. Scheduled for in person visit 9/30/24.

## 2024-09-27 NOTE — PROGRESS NOTES
Assessment/Plan:  Problem List Items Addressed This Visit          Blood    Iron deficiency anemia     Other Visit Diagnoses       Menorrhagia with irregular cycle    -  Primary    Relevant Medications    Tranexamic Acid 650 MG TABS    Other Relevant Orders    CBC and Platelet    Follicle stimulating hormone    Ferritin    US pelvis complete w transvaginal           Patient with menorrhagia with irregular cycles.  Her previous hemoglobin was 10.9 and FSH was not menopausal.  Patient was advised to repeat FSH level as well as hemoglobin to check for anemia and as well as ferritin levels.  Patient with thickened endometrium which she states that she was about to menstruate during that ultrasound.  Advised to repeat ultrasound in 6 months after menstrual cycles.  She did have an endometrial biopsy in July that was negative.  Discussed with patient other differentials including endometrial polyp.  Endometrial hyperplasia or carcinoma had been ruled out.  Patient states she is happy with tranexamic acid to help with heavy menstrual bleeding.  I did discuss other options including endometrial ablation, Mirena IUD or hysterectomy.  Discussed with patient risks associated with hysterectomy including injury to bowel, bladder, wound healing problems, bleeding and infection.  Will continue tranexamic acid for now.  Follow-up after next ultrasound.      Subjective   Patient ID: Sho Harrison is a 54 y.o. female.    Patient is here for a problem visit.    Chief Complaint   Patient presents with    review U/S results     FSH is normal.  She is getting irregular periods.  She states she had pain with her last ultrasound.   Pelvic US shows endometrial lining is 16 mm  Cyst no longer present  Nabothian cyst is seen    24 biopsy is benign endometrium    She states tranexamic acid helps    Cervical and uterine polypectomy 22  Pathology shows benign polyp    Menstrual History:  OB History          4    Para   0     Term   0       0    AB   1    Living   3         SAB   1    IAB   0    Ectopic   0    Multiple   0    Live Births   3           Obstetric Comments   Menarche: 13              Menarche age: 13  No LMP recorded. Patient is premenopausal.         No past medical history on file.    Past Surgical History:   Procedure Laterality Date     SECTION         Social History     Tobacco Use    Smoking status: Never     Passive exposure: Never    Smokeless tobacco: Never   Vaping Use    Vaping status: Never Used   Substance Use Topics    Alcohol use: Not Currently    Drug use: Never        No Known Allergies      Current Outpatient Medications:     escitalopram (LEXAPRO) 10 mg tablet, Take 1 tablet (10 mg total) by mouth daily, Disp: 90 tablet, Rfl: 2    Tranexamic Acid 650 MG TABS, Take 2 tablets (1,300 mg total) by mouth 3 (three) times a day as needed (heavy menses), Disp: 30 tablet, Rfl: 7    cholecalciferol (VITAMIN D3) 1,000 units tablet, Take 1,000 Units by mouth daily (Patient not taking: Reported on 2024), Disp: , Rfl:     miSOPROStol (Cytotec) 200 mcg tablet, Take 1 tablet orally the night before the procedure and one tablet orally the morning of procedure (Patient not taking: Reported on 2024), Disp: 2 tablet, Rfl: 0    multivitamin (THERAGRAN) TABS, Take 1 tablet by mouth daily (Patient not taking: Reported on 2024), Disp: , Rfl:       Pertinent laboratory testing, imaging studies and prior external records reviewed    Review of Systems   Constitutional: Negative.    HENT: Negative.     Eyes: Negative.    Respiratory: Negative.     Cardiovascular: Negative.    Gastrointestinal: Negative.    Endocrine: Negative.    Genitourinary:         As noted in HPI   Musculoskeletal: Negative.    Skin: Negative.    Allergic/Immunologic: Negative.    Neurological: Negative.    Hematological: Negative.    Psychiatric/Behavioral: Negative.           /80   Pulse 84   Ht 5' (1.524 m)   BMI 38.47  kg/m²       Physical Exam  Constitutional:       General: She is not in acute distress.     Appearance: She is well-developed.   Cardiovascular:      Rate and Rhythm: Normal rate.      Pulses: Normal pulses.   Neurological:      Mental Status: She is alert and oriented to person, place, and time.   Skin:     General: Skin is warm and dry.   Psychiatric:         Attention and Perception: Attention normal.         Mood and Affect: Mood normal.         Speech: Speech normal.         Behavior: Behavior normal. Behavior is cooperative.             I have spent a total time of 20 minutes on 09/30/24 in caring for this patient including Diagnostic results, Prognosis, Risks and benefits of tx options, Instructions for management, Patient and family education, Importance of tx compliance, Risk factor reductions, Impressions, Counseling / Coordination of care, Documenting in the medical record, Reviewing / ordering tests, medicine, procedures  , Obtaining or reviewing history  , and Communicating with other healthcare professionals .       Future Appointments   Date Time Provider Department [place]   1/7/2025  4:30 PM PETEY Heck [facility] PCP [place]   4/1/2025  4:00 PM Kaylee Montaño MD Complete [facility] Practice[facility]   6/5/2025  8:30 AM OW MAMMO MOB 1 OW MOB [facility]   9/2/2025  4:30 PM OW MRI 1 OW MRI [facility]   9/9/2025  2:00 PM PETEY Mckeon SURG ONC ALL Practice-Onc

## 2024-09-30 ENCOUNTER — OFFICE VISIT (OUTPATIENT)
Dept: OBGYN CLINIC | Facility: CLINIC | Age: 54
End: 2024-09-30
Payer: COMMERCIAL

## 2024-09-30 VITALS
DIASTOLIC BLOOD PRESSURE: 80 MMHG | SYSTOLIC BLOOD PRESSURE: 138 MMHG | BODY MASS INDEX: 38.47 KG/M2 | HEIGHT: 60 IN | HEART RATE: 84 BPM

## 2024-09-30 DIAGNOSIS — N92.1 MENORRHAGIA WITH IRREGULAR CYCLE: Primary | ICD-10-CM

## 2024-09-30 DIAGNOSIS — D50.0 IRON DEFICIENCY ANEMIA DUE TO CHRONIC BLOOD LOSS: ICD-10-CM

## 2024-09-30 PROCEDURE — 99213 OFFICE O/P EST LOW 20 MIN: CPT | Performed by: OBSTETRICS & GYNECOLOGY

## 2024-09-30 RX ORDER — TRANEXAMIC ACID 650 MG/1
1300 TABLET ORAL 3 TIMES DAILY PRN
Qty: 30 TABLET | Refills: 7 | Status: SHIPPED | OUTPATIENT
Start: 2024-09-30

## 2024-09-30 NOTE — PATIENT INSTRUCTIONS
Abnormal Uterine Bleeding    What is a normal menstrual cycle?  The normal length of the menstrual cycle is typically between 24 days and 38 days. A normal menstrual period generally lasts up to 8 days.   When is bleeding abnormal?  Bleeding in any of the following situations is considered abnormal uterine bleeding:  Bleeding or spotting between periods  Bleeding or spotting after sex  Heavy bleeding during your period  Menstrual cycles that are longer than 38 days or shorter than 24 days  “Irregular” periods in which cycle length varies by more than 7-9 days  Bleeding after menopause  At what ages is abnormal bleeding more common?  Abnormal bleeding can occur at any age. However, at certain times in a woman’s life it is common for periods to be somewhat irregular. Periods may not occur regularly when a girl first starts having them (around age 9-14 years). During perimenopause (beginning in the mid-40s), the number of days between periods may change. It also is normal to skip periods or for bleeding to get lighter or heavier during perimenopause.  What causes abnormal bleeding?  Some of the causes of abnormal bleeding include the following:  Problems with ovulation  Fibroids and polyps  A condition in which the endometrium grows into the wall of the uterus  Bleeding disorders  Problems linked to some birth control methods, such as an intrauterine device (IUD) or birth control pills  Miscarriage  Ectopic pregnancy  Certain types of cancer, such as cancer of the uterus  Your obstetrician-gynecologist (ob-gyn) or other health care professional may start by checking for problems most common in your age group. Some of them are not serious and are easy to treat. Others can be more serious. All should be checked.  How is abnormal bleeding diagnosed?  Your ob-gyn or other health care professional will ask about your health history and your menstrual cycle. It may be helpful to keep track of your menstrual cycle before your  visit. Note the dates, length, and type (light, medium, heavy, or spotting) of your bleeding on a calendar. You also can use a smartphone urban designed to track menstrual cycles.   You will have a physical exam. You also may have blood tests. These tests check your blood count and hormone levels and rule out some diseases of the blood. You also may have a pregnancy test and tests for sexually transmitted  infections (STIs).  What tests may be needed to diagnose abnormal bleeding?  Based on your symptoms and your age, other tests may be needed. Some of these tests can be done in your ob-gyn’s office. Others may be done at a hospital or surgical center:  Ultrasound exam--Sound waves are used to make a picture of the pelvic organs.  Hysteroscopy--A thin, lighted scope is inserted through the vagina and the opening of the cervix. It allows your ob-gyn or other health care professional to see the inside of the uterus.  Endometrial biopsy--A sample of the endometrium is removed and looked at under a microscope.  Sonohysterography--Fluid is placed in the uterus through a thin tube while ultrasound images are made of the inside of the uterus.  Magnetic resonance imaging (MRI)--An MRI exam uses a strong magnetic field and sound waves to create images of the internal organs.  Computed tomography (CT)--This X-ray procedure shows internal organs and structures in cross section.  What medications are used to help control abnormal bleeding?  Medications often are tried first to treat irregular or heavy menstrual bleeding. The medications that may be used include the following:  Hormonal birth control methods--Birth control pills, the skin patch, and the vaginal ring contain hormones. These hormones can lighten menstrual flow. They also help make periods more regular.  Gonadotropin-releasing hormone (GnRH) agonists--These drugs can stop the menstrual cycle and reduce the size of fibroids.  Tranexamic acid--This medication treats  heavy menstrual bleeding.  Nonsteroidal anti-inflammatory drugs--These drugs, which include ibuprofen, may help control heavy bleeding and relieve menstrual cramps.  Antibiotics--If you have an infection, you may be given an antibiotic.  Special medications--If you have a bleeding disorder, your treatment may include medication to help your blood clot.  What types of surgery are performed to treat abnormal bleeding?  If medication does not reduce your bleeding, a surgical procedure may be needed. There are different types of surgery depending on your condition, your age, and whether you want to have more children.  Endometrial ablation destroys the lining of the uterus. It stops or reduces the total amount of bleeding. Pregnancy is not likely after ablation, but it can happen. If it does, the risk of serious complications, including life-threatening bleeding, is greatly increased. If you have this procedure, you will need to use birth control until after menopause.   Uterine artery embolization is a procedure used to treat fibroids. This procedure blocks the blood vessels to the uterus, which in turn stops the blood flow that fibroids need to grow. Another treatment, myomectomy, removes the fibroids but not the uterus.   Hysterectomy, the surgical removal of the uterus, is used to treat some conditions or when other treatments have failed. Hysterectomy also is used to treat endometrial cancer. After the uterus is removed, a woman can no longer get pregnant and will no longer have periods.      Glossary  Abnormal Uterine Bleeding: Bleeding from the uterus that differs in frequency, regularity, duration, or amount from normal uterine bleeding in the absence of pregnancy.   Cervix: The opening of the uterus at the top of the vagina.   Ectopic Pregnancy: A pregnancy in which the fertilized egg begins to grow in a place other than inside the uterus, usually in the fallopian tubes.   Endometrium: The lining of the uterus.    Fibroids: Benign (noncancerous) growths that form on the inside of the uterus, on its outer surface, or within the uterine wall itself.   Gonadotropin-releasing Hormone (GnRH) Agonists: Medical therapy used to block the effects of certain hormones.   Intrauterine device (IUD): A small device that is inserted and left inside the uterus to prevent pregnancy.   Menopause: The process in a woman’s life when ovaries stop functioning and menstruation stops.  Menstrual Cycle: The monthly process of changes that occur to prepare a woman’s body for possible pregnancy. A menstrual cycle is defined as the first day of menstrual bleeding of one cycle to the first day of menstrual bleeding of the next cycle.   Miscarriage: The spontaneous loss of a pregnancy before the fetus can survive outside the uterus.   Obstetrician-Gynecologist (Ob-Gyn): A physician with special skills, training, and education in women’s health.   Ovulation: The release of an egg from one of the ovaries.   Perimenopause: The period before menopause that usually extends from age 45 years to 55 years.   Polyps: Growths that develop from membrane tissue, such as that lining the inside of the uterus.   Sexually Transmitted Infections (STIs): Infections that are spread by sexual contact, including chlamydia, gonorrhea, human papillomavirus (HPV), herpes, syphilis, and human immunodeficiency virus (HIV, the cause of acquired immunodeficiency syndrome [AIDS]).   Tranexamic Acid: A medication prescribed to treat or prevent heavy bleeding.   Uterus: A muscular organ located in the female pelvis that contains and nourishes the developing fetus during pregnancy.

## 2024-11-12 ENCOUNTER — OFFICE VISIT (OUTPATIENT)
Dept: FAMILY MEDICINE CLINIC | Facility: CLINIC | Age: 54
End: 2024-11-12
Payer: COMMERCIAL

## 2024-11-12 VITALS
HEART RATE: 67 BPM | OXYGEN SATURATION: 96 % | HEIGHT: 60 IN | WEIGHT: 197 LBS | SYSTOLIC BLOOD PRESSURE: 124 MMHG | BODY MASS INDEX: 38.68 KG/M2 | DIASTOLIC BLOOD PRESSURE: 80 MMHG

## 2024-11-12 DIAGNOSIS — R05.1 ACUTE COUGH: ICD-10-CM

## 2024-11-12 DIAGNOSIS — R07.0 THROAT PAIN: Primary | ICD-10-CM

## 2024-11-12 PROCEDURE — 99213 OFFICE O/P EST LOW 20 MIN: CPT | Performed by: NURSE PRACTITIONER

## 2024-11-12 RX ORDER — AMOXICILLIN 500 MG/1
500 CAPSULE ORAL EVERY 12 HOURS SCHEDULED
Qty: 14 CAPSULE | Refills: 0 | Status: SHIPPED | OUTPATIENT
Start: 2024-11-12 | End: 2024-11-19

## 2024-11-12 NOTE — PROGRESS NOTES
Ambulatory Visit  Name: Sho Harrison      : 1970      MRN: [id number]  Encounter Provider: PETEY Heck  Encounter Date: 2024   Encounter department: [facility]    Assessment & Plan  Throat pain  Paper script provided for her to fill if needed.    Orders:    amoxicillin (AMOXIL) 500 mg capsule; Take 1 capsule (500 mg total) by mouth every 12 (twelve) hours for 7 days    Acute cough    Orders:    amoxicillin (AMOXIL) 500 mg capsule; Take 1 capsule (500 mg total) by mouth every 12 (twelve) hours for 7 days      She is starting to feel better - does not want to take medication unless needed.  Will provide paper script for abx for her to fill if needed due to her sx worsening.      History of Present Illness     Onset of sx starting 6 days ago - reports cough has improved after coughing up large amounts of mucous.      She reports she is feeling improvement.            Review of Systems   Constitutional: Negative.    HENT:  Positive for congestion, ear pain and sore throat.    Respiratory:  Positive for cough.    Cardiovascular: Negative.    All other systems reviewed and are negative.          Objective     /80 (BP Location: Left arm, Patient Position: Sitting, Cuff Size: Large)   Pulse 67   Ht 5' (1.524 m)   Wt 89.4 kg (197 lb)   SpO2 96%   BMI 38.47 kg/m²     Physical Exam  Constitutional:       Appearance: She is well-developed.   HENT:      Right Ear: Tympanic membrane normal.      Left Ear: Tympanic membrane normal.   Pulmonary:      Effort: Pulmonary effort is normal.      Breath sounds: Normal breath sounds.   Neurological:      General: No focal deficit present.      Mental Status: She is alert and oriented to person, place, and time.

## 2024-11-12 NOTE — LETTER
November 12, 2024     Patient: Sho Harrison  YOB: 1970  Date of Visit: 11/12/2024      To Whom it May Concern:    Sho Harrison is under my professional care. Sho was seen in my office on 11/12/2024. Sho may return to work on 11/12/2024 .    If you have any questions or concerns, please don't hesitate to call.         Sincerely,          PETEY Heck

## 2025-02-05 ENCOUNTER — TELEPHONE (OUTPATIENT)
Dept: FAMILY MEDICINE CLINIC | Facility: CLINIC | Age: 55
End: 2025-02-05

## 2025-02-05 NOTE — TELEPHONE ENCOUNTER
Left vm for pt to call to schedule to schedule physical. Please call and schedule at [phone]. Thank you.

## 2025-03-29 NOTE — PROGRESS NOTES
Virtual Regular VisitName: Sho Harrison      : 1970      MRN: [id number]  Encounter Provider: Kaylee Montaño MD  Encounter Date: 2025   Encounter department: [facility]  :  Assessment & Plan  Menorrhagia with irregular cycle         Iron deficiency anemia due to chronic blood loss         Menorrhagia with irregular cycle         Iron deficiency anemia due to chronic blood loss         Patient advised to repeat her labs as she was previously directed including FSH level to check menopausal status.  She is potentially interested in pursuing endometrial ablation.  We will call her with test results and arrange for additional follow-up once her labs have been completed.  She would like to defer pelvic ultrasound for now.  I advised if endometrial ablation is desired that I would prefer a repeat ultrasound prior to ablation being performed.      History of Present Illness     HPI  She has been having heavy bleeding since last year.  She was on tranexamic acid, she was started by Dr. Marquez    Cervical and uterine polypectomy 22   She states periods are still heavy, with periods lasting for 4-5 days.  Heavy for about 2 days, and is still using TXA.      Study Result    Narrative & Impression   PELVIC ULTRASOUND, COMPLETE     INDICATION: The patient is 54 years old. N83.209: Unspecified ovarian cyst, unspecified side.     COMPARISON: 2024; MRI from 2024     TECHNIQUE: Transabdominal pelvic ultrasound was performed in sagittal and transverse planes with a curvilinear transducer. Additional transvaginal imaging was performed to better evaluate the endometrium and ovaries. Imaging included volumetric sweeps as   well as traditional still imaging technique.     FINDINGS:     UTERUS:  The uterus is anteverted in position, measuring 10.8 x 6.1 x 7.7 cm. Myometrium is mildly heterogeneous. Adenomyosis was noted on the prior MRI.  Complex nabothian cyst measures  1.8 x 1.3 x 1.8 cm, without significant change measured similarly.     ENDOMETRIUM:  The endometrial echo complex has an AP caliber of 16.0 mm previously 9 mm..  Appearance within normal limits.     OVARIES/ADNEXA:  Right ovary: 4.3 x 2.1 x 2.0 cm. 9.6 mL.  Ovarian Doppler flow is within normal limits. The right ovary is only visible transabdominally however there is no evidence of cyst seen which was noted in July. The ovary appears bilobular appearance but similar to the left ovary.  No suspicious ovarian or adnexal abnormality.     Left ovary: 4.5 x 3.6 x 2.2 cm. 18.6 mL.  Ovarian Doppler flow is within normal limits. The left ovary was only seen transabdominally.  No suspicious ovarian or adnexal abnormality.     OTHER:  No free fluid or loculated fluid collections.     IMPRESSION:     Although not visible transvaginally, the cyst noted in the right ovary has probably resolved.     Complex nabothian cyst noted on prior ultrasound and MRI is similar in size.     Continued surveillance in 6 months time could be considered. Correlation with routine Pap smear is also suggested.     Follow-up was marked in the epic system                          Workstation performed: [id number]          Review of Systems   Constitutional: Negative.    HENT: Negative.     Eyes: Negative.    Respiratory: Negative.     Cardiovascular: Negative.    Gastrointestinal: Negative.    Endocrine: Negative.    Genitourinary:         As noted in HPI   Musculoskeletal: Negative.    Skin: Negative.    Allergic/Immunologic: Negative.    Neurological: Negative.    Hematological: Negative.    Psychiatric/Behavioral: Negative.         Objective   There were no vitals taken for this visit.    Physical Exam  Constitutional:       General: She is not in acute distress.     Appearance: She is well-developed.   HENT:      Head: Normocephalic and atraumatic.      Mouth/Throat:      Mouth: Mucous membranes are moist.   Eyes:      Conjunctiva/sclera:  Conjunctivae normal.   Cardiovascular:      Rate and Rhythm: Normal rate and regular rhythm.      Pulses: Normal pulses.      Heart sounds: Normal heart sounds.   Pulmonary:      Effort: Pulmonary effort is normal. No respiratory distress.      Breath sounds: Normal breath sounds.   Abdominal:      General: Abdomen is flat. There is no distension.      Palpations: Abdomen is soft.      Tenderness: There is no abdominal tenderness.   Genitourinary:     General: Normal vulva.      Vagina: No vaginal discharge.   Musculoskeletal:      Cervical back: Normal range of motion.   Skin:     Coloration: Skin is not pale.      Findings: No erythema or rash.   Neurological:      Mental Status: She is alert and oriented to person, place, and time.   Psychiatric:         Mood and Affect: Mood normal.         Behavior: Behavior normal.         Thought Content: Thought content normal.         Judgment: Judgment normal.         Administrative Statements   Encounter provider Kaylee Montaño MD    The Patient is located at Home and in the following state in which I hold an active license [place].    The patient was identified by name and date of birth. Sho Hunter was informed that this is a telemedicine visit and that the visit is being conducted through the Epic Embedded platform. She agrees to proceed..  My office door was closed. No one else was in the room.  She acknowledged consent and understanding of privacy and security of the video platform. The patient has agreed to participate and understands they can discontinue the visit at any time.    I have spent a total time of 15 minutes in caring for this patient on the day of the visit/encounter including Diagnostic results, Prognosis, Risks and benefits of tx options, Instructions for management, Patient and family education, Importance of tx compliance, Impressions, Counseling / Coordination of care, Documenting in the medical record, and Obtaining or reviewing history  , not  including the time spent for establishing the audio/video connection.      Future Appointments   Date Time Provider Department Center   6/5/2025  8:30 AM [facility] 1 [facility]   9/2/2025  4:30 PM OW MRI 1 OW MRI [facility]   9/9/2025  2:00 PM PETEY [facility]G [facility]

## 2025-04-01 ENCOUNTER — TELEMEDICINE (OUTPATIENT)
Dept: OBGYN CLINIC | Facility: CLINIC | Age: 55
End: 2025-04-01
Payer: COMMERCIAL

## 2025-04-01 DIAGNOSIS — N92.1 MENORRHAGIA WITH IRREGULAR CYCLE: Primary | ICD-10-CM

## 2025-04-01 DIAGNOSIS — D50.0 IRON DEFICIENCY ANEMIA DUE TO CHRONIC BLOOD LOSS: ICD-10-CM

## 2025-04-01 PROCEDURE — 99213 OFFICE O/P EST LOW 20 MIN: CPT | Performed by: OBSTETRICS & GYNECOLOGY

## 2025-04-02 ENCOUNTER — TELEPHONE (OUTPATIENT)
Dept: OBGYN CLINIC | Facility: CLINIC | Age: 55
End: 2025-04-02

## 2025-04-02 NOTE — TELEPHONE ENCOUNTER
Patient was called and left voice message in regards to scheduling a yearly with Dr. Montaño sometime after June 14th. Advised patient to call the office to schedule.

## 2025-04-02 NOTE — TELEPHONE ENCOUNTER
----- Message from Kaylee Montaño MD sent at 4/1/2025  4:22 PM EDT -----  Regarding: schedule appt   Schedule for in person visit after June 14 for annual exam

## 2025-04-03 ENCOUNTER — RESULTS FOLLOW-UP (OUTPATIENT)
Dept: OBGYN CLINIC | Facility: CLINIC | Age: 55
End: 2025-04-03

## 2025-04-03 ENCOUNTER — APPOINTMENT (OUTPATIENT)
Age: 55
End: 2025-04-03
Payer: COMMERCIAL

## 2025-04-03 DIAGNOSIS — N92.1 MENORRHAGIA WITH IRREGULAR CYCLE: ICD-10-CM

## 2025-04-03 LAB
ERYTHROCYTE [DISTWIDTH] IN BLOOD BY AUTOMATED COUNT: 14.6 % (ref 11.6–15.1)
FERRITIN SERPL-MCNC: 8 NG/ML (ref 11–307)
FSH SERPL-ACNC: 3 MIU/ML
HCT VFR BLD AUTO: 38.4 % (ref 34.8–46.1)
HGB BLD-MCNC: 11.3 G/DL (ref 11.5–15.4)
MCH RBC QN AUTO: 24.2 PG (ref 26.8–34.3)
MCHC RBC AUTO-ENTMCNC: 29.4 G/DL (ref 31.4–37.4)
MCV RBC AUTO: 82 FL (ref 82–98)
PLATELET # BLD AUTO: 311 THOUSANDS/UL (ref 149–390)
PMV BLD AUTO: 9.8 FL (ref 8.9–12.7)
RBC # BLD AUTO: 4.67 MILLION/UL (ref 3.81–5.12)
WBC # BLD AUTO: 6.26 THOUSAND/UL (ref 4.31–10.16)

## 2025-04-03 PROCEDURE — 85027 COMPLETE CBC AUTOMATED: CPT

## 2025-04-03 PROCEDURE — 83001 ASSAY OF GONADOTROPIN (FSH): CPT

## 2025-04-03 PROCEDURE — 36415 COLL VENOUS BLD VENIPUNCTURE: CPT

## 2025-04-03 PROCEDURE — 82728 ASSAY OF FERRITIN: CPT

## 2025-05-22 ENCOUNTER — OFFICE VISIT (OUTPATIENT)
Age: 55
End: 2025-05-22
Payer: COMMERCIAL

## 2025-05-22 VITALS
HEART RATE: 98 BPM | RESPIRATION RATE: 18 BRPM | WEIGHT: 200 LBS | TEMPERATURE: 97.5 F | HEIGHT: 61 IN | OXYGEN SATURATION: 99 % | DIASTOLIC BLOOD PRESSURE: 80 MMHG | SYSTOLIC BLOOD PRESSURE: 142 MMHG | BODY MASS INDEX: 37.76 KG/M2

## 2025-05-22 DIAGNOSIS — J06.9 ACUTE URI: Primary | ICD-10-CM

## 2025-05-22 PROCEDURE — S9083 URGENT CARE CENTER GLOBAL: HCPCS

## 2025-05-22 PROCEDURE — 99213 OFFICE O/P EST LOW 20 MIN: CPT

## 2025-05-22 RX ORDER — METHYLPREDNISOLONE 4 MG/1
TABLET ORAL
Qty: 21 TABLET | Refills: 0 | Status: SHIPPED | OUTPATIENT
Start: 2025-05-22

## 2025-05-22 RX ORDER — BENZONATATE 200 MG/1
200 CAPSULE ORAL 3 TIMES DAILY PRN
Qty: 20 CAPSULE | Refills: 0 | Status: SHIPPED | OUTPATIENT
Start: 2025-05-22

## 2025-05-22 NOTE — PATIENT INSTRUCTIONS
"Patient Education     Upper respiratory infection in adults - Discharge instructions   The Basics   Written by the doctors and editors at [facility]   What are discharge instructions? -- Discharge instructions are information about how to take care of yourself after getting medical care for a health problem.  What is an upper respiratory infection? -- An upper respiratory infection (\"URI\") is an illness that can affect your nose, throat, ears, and sinuses. Almost all URIs are caused by a virus. The common cold is an example of a viral URI. Some URIs are caused by bacteria, but this is much less common.  URIs spread easily from person to person, most often through coughing or sneezing. A URI will almost always get better in a week or 2 without any treatment. Because most URIs are caused by viruses, antibiotics do not usually help.  If you do have a bacterial infection, your doctor might prescribe antibiotics.  How do I care for myself at home? -- Ask the doctor or nurse what you should do when you go home. Make sure that you understand exactly what you need to do to care for yourself. Ask questions if there is anything you do not understand.  You should also:   Wash your hands often (figure 1), and cough or sneeze into a tissue. If you do not have a tissue, cough or sneeze into your elbow instead of your hands.   Drink lots of fluids (water, juice, or broth) to stay hydrated, unless your doctor told you otherwise. This will help replace any fluids lost through runny nose or fever. Warm tea or soup can also help soothe a sore throat.   To help a stuffy nose and make it easier to breathe:   Use saline nose drops or spray.   Use a humidifier if the air in your home feels dry.   Follow the directions on the label carefully if you take over-the-counter cough or cold medicines. Do not take more than 1 medicine that contains acetaminophen. Also, if you have a heart problem or high blood pressure, check with your doctor before " you take any of these medicines.   Try to quit smoking if you smoke. Your doctor or nurse can help.  How can I prevent getting another URI? -- The best way to prevent a URI, or keep it from spreading to others, is to keep your hands clean. Wash your hands often with soap and water or alcohol gel rubs.  Some other ways to prevent the spread of infection include:   Always wash your hands with soap and water after you cough, sneeze, or blow your nose.   Clean surfaces and objects that you touch a lot. These include sinks, counters, tables, door handles, remotes, and phones. Use a bleach and water mixture. The germs that cause a URI can live on surfaces for at least 2 hours.   Do not share cups, food, towels, bed linens, or other personal items.   Stay away from other people when you are sick. When you do need to be around other people, consider wearing a face mask.  When should I call the doctor? -- Call for advice if:   You have a persistent fever of 100.4°F (38°C) or higher, chills, a very bad sore throat, or ear or sinus pain.   You get a new fever after several days of feeling the same or getting better.   You start having chest pain when you cough.   You have a cough that lasts more than 10 days.   You cough up blood.   You have any new or worsening symptoms, such as worsening cough or trouble breathing.  All topics are updated as new evidence becomes available and our peer review process is complete.  This topic retrieved from [employer]rigg on: Mar 13, 2024.  Topic 753776 Version 1.0  Release: 32.2.4 - C32.71  © 2024 UpToDate, Inc. and/or its affiliates. All rights reserved.  figure 1: How to wash your hands     Wet your hands with clean water, and apply a small amount of soap. Lather and rub hands together for at least 20 seconds. Clean your wrists, palms, backs of your hands, between your fingers, tips of your fingers, thumbs, and under and around your nails. Rinse well, and dry your hands using a clean  ab.  Graphic 022338 Version 7.0  Consumer Information Use and Disclaimer   Disclaimer: This generalized information is a limited summary of diagnosis, treatment, and/or medication information. It is not meant to be comprehensive and should be used as a tool to help the user understand and/or assess potential diagnostic and treatment options. It does NOT include all information about conditions, treatments, medications, side effects, or risks that may apply to a specific patient. It is not intended to be medical advice or a substitute for the medical advice, diagnosis, or treatment of a health care provider based on the health care provider's examination and assessment of a patient's specific and unique circumstances. Patients must speak with a health care provider for complete information about their health, medical questions, and treatment options, including any risks or benefits regarding use of medications. This information does not endorse any treatments or medications as safe, effective, or approved for treating a specific patient. UpToDate, Inc. and its affiliates disclaim any warranty or liability relating to this information or the use thereof.The use of this information is governed by the Terms of Use, available at https://www.woltersVictivuwer.com/en/know/clinical-effectiveness-terms. 2024© UpToDate, Inc. and its affiliates and/or licensors. All rights reserved.  Copyright   © 2024 UpToDate, Inc. and/or its affiliates. All rights reserved.

## 2025-05-22 NOTE — PROGRESS NOTES
[facility]        NAME: Sho Harrison is a 55 y.o. female  : 1970    MRN: [id number]  DATE: May 22, 2025  TIME: 5:16 PM    Assessment and Plan   Acute URI [J06.9]  1. Acute URI  methylPREDNISolone 4 MG tablet therapy pack    benzonatate (TESSALON) 200 MG capsule        Mucinex recommended for congestion. No signs of bacterial infection today. Discussed we will treat with steroid for ear pressure and sinus pressure. Follow up with PCP in 3-5 days if no improvement. Proceed to ER if symptoms worsen.    Medical Decision Making     PROBLEM: 1 acute, uncomplicated illness or injury    DATA: Note(s) Reviewed: yes, chart review    RISK: Prescription drug management    TIME: 20 min     Chief Complaint     Chief Complaint   Patient presents with    Cold Like Symptoms     Congestion, ear discomfort, post nasal drip, +cough with mucous production yellow to green and now more yellow.  Took cough drops/orange juice with aide and cayenne     History of Present Illness     Sho Harrison is a 55 y.o. female presenting to the office today for upper respiratory complaints.   Symptoms have been present for 5 days, and include cough, congestion, postnasal drip chest congestion.   She has tried cough drops, orange juice for her symptoms, minimal relief.    Review of Systems     Review of Systems   Constitutional:  Negative for chills and fever.   HENT:  Positive for congestion, postnasal drip and sore throat. Negative for trouble swallowing.    Respiratory:  Positive for cough, chest tightness and wheezing (unsure if from throat). Negative for shortness of breath.    Gastrointestinal:  Positive for nausea. Negative for vomiting.   Genitourinary: Negative.    Musculoskeletal: Negative.    Skin: Negative.    Neurological: Negative.      Current Medications     Current Medications[1]    Current Allergies     Allergies as of 2025    (No Known Allergies)          The following portions of the  ED NOTE    Patient : Lenny Giraldo Age: 65 year old Sex: male   MRN: [id number] Encounter Date: 3/6/2025    History of Present Illness      Chief Complaint   Patient presents with    Medical Screening Exam       Lenny Giraldo is a 65 year old male who  has no past medical history on file. presents to the ER today with request for routine x-ray to rule out tuberculosis.  Patient is currently doing paperwork for a new job requiring him to get x-rays to rule out tuberculosis due to a recent positive skin tuberculin test.  Patient has no history of tuberculosis, known exposures, or symptoms such as hemoptysis, chest pain, cough, shortness of breath, night sweats, fevers, or chills.  Patient has never had a positive skin test before and has no other complaints.      Review of Systems        All systems reviewed and negative except as noted in the HPI above    Past Medical History     No Known Allergies    No past medical history on file.    No past surgical history on file.    No family history on file.    Social History     Tobacco Use    Smoking status: Every Day     Types: Cigarettes    Smokeless tobacco: Never   Substance Use Topics    Alcohol use: Not Currently       Physical Exam     ED Triage Vitals [03/06/25 1915]   ED Triage Vitals Group      Temp 98 °F (36.7 °C)      Heart Rate 64      Resp 18      BP (!) 150/85      SpO2 97 %      EtCO2 mmHg       Height       Weight 179 lb 10.8 oz (81.5 kg)      Weight Scale Used Standing scale      BMI (Calculated)       IBW/kg (Calculated)      General:  No acute distress, Awake, Alert, Well appearing.  Skin:  Warm, dry, small area of induration/redness present over the patient's left forearm consistent with positive skin tuberculin test, no other rashes or lesions present  Head/Neck:  Normocephalic, atraumatic, no appreciable anterior or posterior cervical lymphadenopathy appreciated   Eye:  Pupils are equal, round and reactive to light, extraocular movements are intact, vision  "patient's history were reviewed and updated as appropriate: allergies, current medications, past family history, past medical history, past social history, past surgical history and problem list.     Past Medical History[2]    Past Surgical History[3]    Family History[4]    Medications have been verified.    Objective     /80 (BP Location: Right arm, Patient Position: Sitting)   Pulse 98   Temp 97.5 °F (36.4 °C) (Tympanic)   Resp 18   Ht 5' 0.5\" (1.537 m)   Wt 90.7 kg (200 lb)   LMP 05/02/2025 (Approximate)   SpO2 99%   BMI 38.42 kg/m²   Patient's last menstrual period was 05/02/2025 (approximate).     Physical Exam     Physical Exam  Vitals and nursing note reviewed.   Constitutional:       General: She is not in acute distress.     Appearance: Normal appearance. She is normal weight. She is not ill-appearing, toxic-appearing or diaphoretic.   HENT:      Head: Normocephalic and atraumatic.      Right Ear: Tympanic membrane, ear canal and external ear normal. There is no impacted cerumen.      Left Ear: Tympanic membrane, ear canal and external ear normal. There is no impacted cerumen.      Nose: Congestion and rhinorrhea present.      Mouth/Throat:      Mouth: Mucous membranes are moist.      Pharynx: Posterior oropharyngeal erythema present. No oropharyngeal exudate.     Eyes:      General: No scleral icterus.        Right eye: No discharge.         Left eye: No discharge.      Conjunctiva/sclera: Conjunctivae normal.       Cardiovascular:      Rate and Rhythm: Normal rate and regular rhythm.      Pulses: Normal pulses.      Heart sounds: Normal heart sounds. No murmur heard.     No friction rub. No gallop.   Pulmonary:      Effort: Pulmonary effort is normal. No respiratory distress.      Breath sounds: Normal breath sounds. No stridor. No wheezing, rhonchi or rales.   Chest:      Chest wall: No tenderness.     Musculoskeletal:         General: Normal range of motion.      Cervical back: Normal " grossly normal.  Ears, nose, mouth and throat:  Oral mucosa moist, posterior pharynx non-erythematous without tonsillar hypertrophy or exudate, uvula midline, no posterior pharyngeal abscess, external nose straight without bruising or deformity, no rhinorrhea present, external ears normal  Cardiovascular: Heart RRR w/o MGR  Respiratory: Patient breathing comfortably on room air without increased respiratory effort.  Lungs clear to auscultation bilaterally without wheezes rales or rhonchi.   Gastrointestinal: Abdomen soft, nondistended and nontender to palpation  Musculoskeletal:  No deformity  Neurological:  Alert and oriented to person, place, time, and situation, No focal neurological deficit observed, normal speech observed    ED Course      ED Course as of 03/07/25 1343   Thu Mar 06, 2025   2125 CXR:  No radiographic evidence of an acute cardiopulmonary process.    [NW]      ED Course User Index  [NW] Severiano Perez PA-C       Procedures    ED Medication Orders (From admission, onward)      None            Vitals:    03/06/25 1915 03/06/25 2100   BP: (!) 150/85 (!) 144/76   Pulse: 64 67   Resp: 18 17   Temp: 98 °F (36.7 °C)    SpO2: 97% 97%   Weight: 81.5 kg (179 lb 10.8 oz)        Lab Results     No results found for this visit on 03/06/25.    EKG Results     EKG Interpretation  No results found for this or any previous visit (from the past 4464 hour(s)).    EKG tracing interpreted by ED physician       Radiology Results     Imaging Results              XR CHEST AP OR PA (Final result)  Result time 03/06/25 21:19:02      Final result                   Impression:      No radiographic evidence of an acute cardiopulmonary process.          Electronically Signed by: DANIELLA LAWRENCE M.D.   Signed on: 3/6/2025 9:19 PM   Workstation ID: [id number]               Narrative:    EXAM: XR CHEST AP OR PA    CLINICAL INDICATION: Medical screening examination, history of TB    COMPARISON: Chest radiograph from  8/18/2024    TECHNIQUE: Single portable, upright frontal projection of the chest    FINDINGS:     The lungs appear clear. The cardiomediastinal silhouette is within normal  limits. There is no pulmonary vascular congestion, sizable pleural  effusion, or pneumothorax.                                        Medical Decision Making    Medical Decision Making      Lenny Giraldo is a 65 year old male who presented to the ER today with request for chest x-ray to rule out TB.  Please see above for full detailed HPI.  The patient was nontoxic, afebrile and their vitals were stable. Please see above for detailed physical exam.    Patient resting comfortably, in no acute distress.  Chest x-ray was obtained which was negative for any abnormality or acute cardiopulmonary process.  These findings were discussed with patient and x-ray results were given to patient with discharge paperwork.  Signs and symptoms of tuberculosis were discussed and return to ED precautions were educated the patient and given to them with AVS.  All questions from patient were answered and patient was discharged home in good condition.      Clinical Impression     ED Diagnosis   1. Screening examination for pulmonary tuberculosis            Disposition        Discharge 3/6/2025  9:27 PM  Lenny Giraldo discharge to home/self care.      Severiano Perez PA-C   3/7/2025 8:05 PM     Discharge Medication List as of 3/6/2025  9:27 PM            Severiano Perez PA-C  03/07/25 7995     range of motion and neck supple. No rigidity or tenderness.   Lymphadenopathy:      Cervical: No cervical adenopathy.     Skin:     General: Skin is warm and dry.      Capillary Refill: Capillary refill takes less than 2 seconds.      Coloration: Skin is not jaundiced.      Findings: No bruising or rash.     Neurological:      General: No focal deficit present.      Mental Status: She is alert. Mental status is at baseline.     Psychiatric:         Mood and Affect: Mood normal.         Behavior: Behavior normal.                        [1]   Current Outpatient Medications:     benzonatate (TESSALON) 200 MG capsule, Take 1 capsule (200 mg total) by mouth 3 (three) times a day as needed for cough, Disp: 20 capsule, Rfl: 0    escitalopram (LEXAPRO) 10 mg tablet, Take 1 tablet (10 mg total) by mouth daily, Disp: 90 tablet, Rfl: 2    methylPREDNISolone 4 MG tablet therapy pack, Use as directed on package, Disp: 21 tablet, Rfl: 0    Tranexamic Acid 650 MG TABS, Take 2 tablets (1,300 mg total) by mouth 3 (three) times a day as needed (heavy menses), Disp: 30 tablet, Rfl: 7    cholecalciferol (VITAMIN D3) 1,000 units tablet, Take 1,000 Units by mouth daily (Patient not taking: Reported on 2024), Disp: , Rfl:     multivitamin (THERAGRAN) TABS, Take 1 tablet by mouth daily (Patient not taking: Reported on 2024), Disp: , Rfl:   [2] No past medical history on file.  [3]   Past Surgical History:  Procedure Laterality Date     SECTION      LIPOMA RESECTION N/A    [4]   Family History  Problem Relation Name Age of Onset    No Known Problems Mother      No Known Problems Father      No Known Problems Sister      No Known Problems Sister      No Known Problems Daughter      Breast cancer Maternal Grandmother          late 60's    No Known Problems Maternal Grandfather      No Known Problems Paternal Grandmother      No Known Problems Paternal Grandfather      No Known Problems Maternal Aunt      No Known Problems  Maternal Aunt      No Known Problems Maternal Aunt      Colon cancer Paternal Uncle

## 2025-05-24 ENCOUNTER — TELEPHONE (OUTPATIENT)
Age: 55
End: 2025-05-24

## 2025-05-24 NOTE — TELEPHONE ENCOUNTER
Sho called and reports steroid is causing her to not be able to sleep at night and she wanted to know if she can take this medication earlier in the day. Per Dr Vazquez, ok to take this medication in the morning with her breakfast meal to avoid taking it too late in the day to affect her sleep.

## 2025-06-05 ENCOUNTER — HOSPITAL ENCOUNTER (OUTPATIENT)
Dept: RADIOLOGY | Facility: CLINIC | Age: 55
End: 2025-06-05

## 2025-06-05 VITALS — WEIGHT: 197 LBS | BODY MASS INDEX: 37.19 KG/M2 | HEIGHT: 61 IN

## 2025-06-05 DIAGNOSIS — Z12.31 ENCOUNTER FOR SCREENING MAMMOGRAM FOR BREAST CANCER: ICD-10-CM

## 2025-06-05 PROCEDURE — 77067 SCR MAMMO BI INCL CAD: CPT

## 2025-06-05 PROCEDURE — 77063 BREAST TOMOSYNTHESIS BI: CPT

## 2025-06-07 DIAGNOSIS — F41.9 ANXIETY: ICD-10-CM

## 2025-06-09 RX ORDER — ESCITALOPRAM OXALATE 10 MG/1
10 TABLET ORAL DAILY
Qty: 90 TABLET | Refills: 0 | Status: SHIPPED | OUTPATIENT
Start: 2025-06-09

## 2025-06-10 ENCOUNTER — RESULTS FOLLOW-UP (OUTPATIENT)
Dept: FAMILY MEDICINE CLINIC | Facility: CLINIC | Age: 55
End: 2025-06-10